# Patient Record
Sex: FEMALE | Race: WHITE | NOT HISPANIC OR LATINO | Employment: OTHER | URBAN - METROPOLITAN AREA
[De-identification: names, ages, dates, MRNs, and addresses within clinical notes are randomized per-mention and may not be internally consistent; named-entity substitution may affect disease eponyms.]

---

## 2021-03-02 ENCOUNTER — OFFICE VISIT (OUTPATIENT)
Dept: FAMILY MEDICINE CLINIC | Facility: CLINIC | Age: 86
End: 2021-03-02

## 2021-03-02 VITALS
TEMPERATURE: 97.8 F | HEART RATE: 77 BPM | WEIGHT: 97.8 LBS | HEIGHT: 62 IN | SYSTOLIC BLOOD PRESSURE: 124 MMHG | DIASTOLIC BLOOD PRESSURE: 82 MMHG | BODY MASS INDEX: 18 KG/M2

## 2021-03-02 DIAGNOSIS — I44.0 FIRST DEGREE AV BLOCK: ICD-10-CM

## 2021-03-02 DIAGNOSIS — R26.89 UNSTABLE BALANCE: ICD-10-CM

## 2021-03-02 DIAGNOSIS — Z99.89 OBSTRUCTIVE SLEEP APNEA ON CPAP: ICD-10-CM

## 2021-03-02 DIAGNOSIS — R00.2 PALPITATIONS: Primary | ICD-10-CM

## 2021-03-02 DIAGNOSIS — R01.1 MURMUR, CARDIAC: ICD-10-CM

## 2021-03-02 DIAGNOSIS — G47.33 OBSTRUCTIVE SLEEP APNEA ON CPAP: ICD-10-CM

## 2021-03-02 DIAGNOSIS — G40.409 GRAND MAL SEIZURE DISORDER (HCC): ICD-10-CM

## 2021-03-02 PROCEDURE — 93000 ELECTROCARDIOGRAM COMPLETE: CPT | Performed by: FAMILY MEDICINE

## 2021-03-02 PROCEDURE — 99203 OFFICE O/P NEW LOW 30 MIN: CPT | Performed by: FAMILY MEDICINE

## 2021-03-02 RX ORDER — PROPRANOLOL HYDROCHLORIDE 20 MG/1
20 TABLET ORAL 2 TIMES DAILY
COMMUNITY
End: 2021-04-20 | Stop reason: SDUPTHER

## 2021-03-02 RX ORDER — MULTIPLE VITAMINS W/ MINERALS TAB 9MG-400MCG
1 TAB ORAL DAILY
COMMUNITY

## 2021-03-02 RX ORDER — MULTIVIT WITH MINERALS/LUTEIN
1000 TABLET ORAL DAILY
COMMUNITY
End: 2021-04-14 | Stop reason: HOSPADM

## 2021-03-02 RX ORDER — ERGOCALCIFEROL 1.25 MG/1
3000 CAPSULE ORAL DAILY
COMMUNITY

## 2021-03-02 NOTE — PROGRESS NOTES
"Chief Complaint  New Patient (Est care with a new PCP, patients main concerns is getting a referal to a neurologist, she is very unsteady and shaky. Very worried about falling. She walks with assistance from another person)    Subjective          Mariela Hopkins presents to [facility]  Patient is here to establish care.  She is here from [place].  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  She spent many years on Dilantin and then in 2015 she was changed to Keppra by a new neurologist.  Also her neurologist started her on propranolol for tremors in 2017.  She is here today because about 2 years ago she started noticing being off balance with ambulation.  The neurologist felt that it was related to the years of Dilantin use which had caused a peripheral neuropathy.  Her primary care provider thought it was vertigo and prescribed physical therapy but the patient did not agree with that assessment and did not go to physical therapy.  But the patient states that she is not dizzy.  She states that she does feel lightheaded at times but the biggest issue is she \"feels like she is walking and Jell-O\".  She feels much more comfortable with ambulation if she holds on to someone or something while she is walking.  The patient has had a trip and fall down the stairs about 2 years ago but has not had any recent falls. Patient denies any LOC, CP or SOA.  However, recently she has had a few episodes of racing heart rate.  They were not associated with chest pain or shortness of breath and they were brief, during episodes of high stress.         Objective   Vital Signs:   /82   Pulse 77   Temp 97.8 °F (36.6 °C) (Temporal)   Ht 157.5 cm (62\")   Wt 44.4 kg (97 lb 12.8 oz)   BMI 17.89 kg/m²     Physical Exam  Vitals signs and nursing note reviewed.   Constitutional:       Appearance: Normal appearance. She is well-developed.   HENT:      Head: " Normocephalic and atraumatic.   Eyes:      Conjunctiva/sclera: Conjunctivae normal.   Neck:      Musculoskeletal: Normal range of motion and neck supple.   Cardiovascular:      Rate and Rhythm: Normal rate and regular rhythm. Occasional extrasystoles are present.     Pulses:           Dorsalis pedis pulses are 1+ on the right side and 1+ on the left side.        Posterior tibial pulses are 1+ on the right side and 2+ on the left side.      Heart sounds: Murmur (2/6 PILY ) present.   Pulmonary:      Effort: Pulmonary effort is normal.      Breath sounds: Normal breath sounds.   Musculoskeletal: Normal range of motion.      Right lower leg: No edema.      Left lower leg: No edema.   Skin:     General: Skin is warm and dry.      Capillary Refill: Capillary refill takes less than 2 seconds.      Findings: No rash.   Neurological:      Mental Status: She is alert and oriented to person, place, and time.   Psychiatric:         Mood and Affect: Mood normal.         Behavior: Behavior normal.         Thought Content: Thought content normal.         Judgment: Judgment normal.        Result Review :   The following data was reviewed by: Marla Mccray DO on 03/02/2021:           ECG 12 Lead    Date/Time: 3/2/2021 6:01 PM  Performed by: Marla Mccray DO  Authorized by: Marla Mccray DO   Previous ECG: no previous ECG available  Rhythm: sinus rhythm  Ectopy: unifocal PVCs and infrequent PVCs  Rate: normal  Conduction: 1st degree AV block  ST Segments: ST segments normal  T Waves: T waves normal    Clinical impression: abnormal EKG  Comments: Baseline artifact              Assessment and Plan    Diagnoses and all orders for this visit:    1. Palpitations (Primary)  -     Holter monitor - 24 hour; Future  -     Adult Transthoracic Echo Complete W/ Cont if Necessary Per Protocol; Future  -     ECG 12 Lead    2. Unstable balance  -     Holter monitor - 24 hour; Future  -     Adult Transthoracic Echo Complete W/ Cont if  Necessary Per Protocol; Future  -     Ambulatory Referral to Neurology  -     ECG 12 Lead    3. 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 Obstructive sleep apnea on CPAP  -     Ambulatory Referral to Neurology    5. Murmur, cardiac  -     Holter monitor - 24 hour; Future  -     Adult Transthoracic Echo Complete W/ Cont if Necessary Per Protocol; Future  -     ECG 12 Lead    6. First degree AV block      I will review lab results from the old PCP when obtained.  Consider repeating labs in 3 months.  If echo does not show any structural abnormalities and the Holter does not show any pathologic rhythms then I feel that the patient's unsteady gait and tremors are related to a neurologic issue and will leave the work-up up to the neurologist I have her referred her to.      Follow Up   Return in about 3 months (around 6/2/2021) for Next scheduled follow up.  Patient was given instructions and counseling regarding her condition or for health maintenance advice. Please see specific information pulled into the AVS if appropriate.

## 2021-03-12 ENCOUNTER — TELEPHONE (OUTPATIENT)
Dept: FAMILY MEDICINE CLINIC | Facility: CLINIC | Age: 86
End: 2021-03-12

## 2021-03-12 NOTE — TELEPHONE ENCOUNTER
Caller: KERRIE KEYES    Relationship: Emergency Contact    Best call back number: [phone]    What orders are you requesting (i.e. lab or imaging):   Heart Test   In what timeframe would the patient need to come in: ASAP     Where will you receive your lab/imaging services:     Additional notes: Patient's daughter called in and wanted an update on the test the Dr ordered for her heart. She said she has not heard anything. Please call patient and advise.

## 2021-04-14 ENCOUNTER — TELEPHONE (OUTPATIENT)
Dept: NEUROLOGY | Facility: CLINIC | Age: 86
End: 2021-04-14

## 2021-04-14 ENCOUNTER — OFFICE VISIT (OUTPATIENT)
Dept: NEUROLOGY | Facility: CLINIC | Age: 86
End: 2021-04-14

## 2021-04-14 ENCOUNTER — LAB (OUTPATIENT)
Dept: LAB | Facility: HOSPITAL | Age: 86
End: 2021-04-14

## 2021-04-14 VITALS
HEART RATE: 94 BPM | BODY MASS INDEX: 27.97 KG/M2 | WEIGHT: 152 LBS | HEIGHT: 62 IN | DIASTOLIC BLOOD PRESSURE: 70 MMHG | OXYGEN SATURATION: 94 % | SYSTOLIC BLOOD PRESSURE: 114 MMHG

## 2021-04-14 DIAGNOSIS — G40.209 PARTIAL SYMPTOMATIC EPILEPSY WITH COMPLEX PARTIAL SEIZURES, NOT INTRACTABLE, WITHOUT STATUS EPILEPTICUS (HCC): ICD-10-CM

## 2021-04-14 DIAGNOSIS — G62.9 PERIPHERAL POLYNEUROPATHY: Primary | ICD-10-CM

## 2021-04-14 DIAGNOSIS — R26.89 BALANCE PROBLEM: ICD-10-CM

## 2021-04-14 LAB
RPR SER QL: NORMAL
TSH SERPL DL<=0.05 MIU/L-ACNC: 0.8 UIU/ML (ref 0.27–4.2)
VIT B12 BLD-MCNC: 443 PG/ML (ref 211–946)

## 2021-04-14 PROCEDURE — 82607 VITAMIN B-12: CPT | Performed by: PSYCHIATRY & NEUROLOGY

## 2021-04-14 PROCEDURE — 84155 ASSAY OF PROTEIN SERUM: CPT | Performed by: PSYCHIATRY & NEUROLOGY

## 2021-04-14 PROCEDURE — 86592 SYPHILIS TEST NON-TREP QUAL: CPT | Performed by: PSYCHIATRY & NEUROLOGY

## 2021-04-14 PROCEDURE — 99205 OFFICE O/P NEW HI 60 MIN: CPT | Performed by: PSYCHIATRY & NEUROLOGY

## 2021-04-14 PROCEDURE — 82784 ASSAY IGA/IGD/IGG/IGM EACH: CPT | Performed by: PSYCHIATRY & NEUROLOGY

## 2021-04-14 PROCEDURE — 82595 ASSAY OF CRYOGLOBULIN: CPT | Performed by: PSYCHIATRY & NEUROLOGY

## 2021-04-14 PROCEDURE — 83825 ASSAY OF MERCURY: CPT | Performed by: PSYCHIATRY & NEUROLOGY

## 2021-04-14 PROCEDURE — 84443 ASSAY THYROID STIM HORMONE: CPT | Performed by: PSYCHIATRY & NEUROLOGY

## 2021-04-14 PROCEDURE — 36415 COLL VENOUS BLD VENIPUNCTURE: CPT | Performed by: PSYCHIATRY & NEUROLOGY

## 2021-04-14 PROCEDURE — 82175 ASSAY OF ARSENIC: CPT | Performed by: PSYCHIATRY & NEUROLOGY

## 2021-04-14 PROCEDURE — 83655 ASSAY OF LEAD: CPT | Performed by: PSYCHIATRY & NEUROLOGY

## 2021-04-14 PROCEDURE — 86334 IMMUNOFIX E-PHORESIS SERUM: CPT | Performed by: PSYCHIATRY & NEUROLOGY

## 2021-04-14 PROCEDURE — 84165 PROTEIN E-PHORESIS SERUM: CPT | Performed by: PSYCHIATRY & NEUROLOGY

## 2021-04-14 RX ORDER — MULTIVIT WITH MINERALS/LUTEIN
250 TABLET ORAL DAILY
COMMUNITY
End: 2023-01-17

## 2021-04-14 NOTE — TELEPHONE ENCOUNTER
Med Rec Request faxed to [facility] [employer] [facility] per representative at [phone].

## 2021-04-14 NOTE — TELEPHONE ENCOUNTER
----- Message from Marianna Elder MD sent at 4/14/2021  9:42 AM EDT -----  Regarding: labs  Can we request labs from this patient's previous primary care physician?  Dr. Booker [phone].    Thanks!

## 2021-04-14 NOTE — PATIENT INSTRUCTIONS
• At night, only use the bed and bedroom for sleep and sex only. Do not read, watch TV, eat, or worry in bed.   • Lie down only when you are sleepy.   • If you are unable to fall asleep, get up and go to another room. Avoid stimulating activities if you do get up.   • No clocks (if you tend to look at the clock throughout the night) or TV (or other electronic screens) in the bedroom.   • Avoid screens (TV, computer, tablet, cell phone) the hour prior to bedtime and during your sleep period.   • Establish a regular bedtime routine and a regular sleep/wake schedule. Keep this schedule 7 days a week.   • Do not eat or drink close to bedtime.   • Make sure your bedroom is dark, cool, and comfortable.   • If you need background noise, use a fan or a white noise machine.   • Avoid caffeine (regular coffee, decaf coffee, tea, sodas, chocolate, energy drinks).   • Avoid alcohol and nicotine close to bedtime.   • Exercise during the day, but not within 3 hours of bedtime.   • Avoid naps.   • Check if any of your night time medications may cause sleep problems.   • If you have heart burn or indigestion at night: avoid eating close to bedtime; elevated your head of bed; avoid spicy foods, tomatoes, citrus, alcohol, caffeine, and mint at night; take your antacid at night; sleep on your left side.   • If you have nasal stuffiness or congestion: consider taking an over the counter allergy medicine such as zyrtec, claritin, or allegra at night as well as a nasal spray such as Flonase or Nasacort.   • If you gasp for air at night, stop breathing in your sleep, have night sweats, snore, unrefreshing sleep, feel tired during the day, have morning headaches or dry mouth in the morning, you may be at risk for having problems with your breathing at night, likely sleep apnea. You may want to talk to your doctor about these symptoms.   • Consider trying melatonin at night. This can be purchased over the counter without a prescription.  I  recommend doses between 0.5-3 mg.  Try [employer], [employer], [employer] Extension (on Amazon) or REM Fresh brands.    • Consider the Night Equivalent DATAl Sleep  maggie or CBT-I  for your smart device.

## 2021-04-14 NOTE — PROGRESS NOTES
Subjective:     Patient ID: Mariela Hopkins is a 87 y.o. female.    The patient is a 87-year-old right-handed female with history of a brain aneurysm status post surgery, cataract status post removal, skin cancer, epilepsy, gallstones, headaches, hearing loss, neuropathy, sleep apnea on CPAP, osteoporosis, and PRP (a dermatologic condition) who presents to the neurology clinic today as a new patient for the evaluation of balance problems, tremor, sleep apnea, and epilepsy.  I reviewed the patient's records.  I reviewed the referring physician's note from March 2, 2021.  Reports the patient has a history of a seizure disorder after having a brain aneurysm many years ago.  She was on Dilantin and then 2015 was switched to Keppra.  She was on propranolol for tremor since 2017.  She feels off balance.  It was thought that maybe she had a peripheral neuropathy from her Dilantin use.  She reports that she is lightheaded and feels like she is walking on Jell-O.  She denies any recent falls.  I reviewed her most recent neurologist note from May 26, 2020.  Reports that she has osteoporosis that could have been related to her chronic antiseizure medication use as well as neuropathy.  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  It reports that she has a large fiber neuropathy, vitamin D deficiency, essential tremor and sleep apnea on CPAP.  He reports that she has not had a seizure in over 20 years.  He reports that higher doses of propranolol caused her to feel more depressed and dizzy.  She since had her first seizure in her 30s.  It was when she was pregnant.  The patient had a EEG on March 7, 2016 that showed right temporal slowing with a breach rhythm.  She had a head CT with contrast and a CT of the head in 2012 that showed postsurgical changes with a right parietal clip.    Is so shaky. In both feet when she walks.  Ankles and calves feels like they were wrapped in duct tape.   Like her brain isn't connected to her feet.  Symptoms started about 1-2 years.  But has gotten worse over time.  Worse in the mornings.  Feels better by evening.  Gets dizzy when she rolls over.  Not like past vertigo episodes.  Some days are better than others.  Has been under a lot of stress.  No falls in the past year.  Did PT 5 years ago for 6 months.  Balance has worsened since then.  Moving into assisted living.  Has not had an EMG.  Has neck pain.  No h/o injuries, surgery.      Brain aneurysm ruptured 50 years ago while pregnant and had her first seizure after.  Had one brain surgery.  Had 6 seizures.  Last one was 25 years ago.  Would get a feeling before (like a disconnect).  Then would have LOC.  Whole body shaking.  Afterwards, she was sleepy.  No incontinence, tongue biting, injuries.  No triggers.  Before her seizures, she would get weakness in her left hand.  Currently on  mg BID.  Has been on it for 3 years.  No higher doses.  No SE.  It is affordable.  Was on PHT and PB in the past for 40 years.      Clusters? no  Status? no    Risk factors:  Childhood/febrile seizures? no  Head trauma/pathology? Brain aneurysm  CNS infections? no  Family history of seizures? no    Driving? no  MRI brain: Not sure      The following portions of the patient's history were reviewed and updated as appropriate: allergies, current medications, past family history, past medical history, past social history, past surgical history and problem list.    Review of Systems   Constitutional: Positive for activity change and fatigue. Negative for appetite change.   HENT: Positive for hearing loss. Negative for tinnitus and trouble swallowing.    Eyes: Negative for photophobia, pain and visual disturbance.   Respiratory: Negative for cough, chest tightness and shortness of breath.    Cardiovascular: Negative for chest pain, palpitations and leg swelling.   Gastrointestinal: Positive for constipation.   Endocrine: Negative  for cold intolerance, heat intolerance and polydipsia.   Musculoskeletal: Positive for gait problem, neck pain and neck stiffness.   Allergic/Immunologic: Negative for environmental allergies, food allergies and immunocompromised state.   Neurological: Positive for tremors, weakness, light-headedness and headaches. Negative for dizziness, seizures, syncope, facial asymmetry, speech difficulty and numbness.   Psychiatric/Behavioral: Positive for sleep disturbance. Negative for agitation, behavioral problems, confusion, decreased concentration, dysphoric mood, hallucinations, self-injury and suicidal ideas. The patient is nervous/anxious. The patient is not hyperactive.     I reviewed the ROS documented by the MA.  All other systems negative.      Objective:    Neurologic Exam    Physical Exam   **The patient is wearing a mask**  Constitutional:  Vital signs reviewed.  No apparent distress.  Well groomed.  Eyes:  No injection, no icterus.    Respiratory:  Normal effort.  Clear to auscultation bilaterally.  Cardiovascular:  Regular rate and rhythm.  No murmurs.  No carotid bruits. Symmetric radial pulses.  Musculoskeletal: Unsteady gait.  Present Romberg.  No focal weakness appreciated.  Skin:  No rashes.  Warm, dry, and intact.  Psychiatric:  Good mood.  Normal affect.    Neurologic:  Mental status-  The patient is alert and oriented to person, place and time. Attention/concentration is within normal limits.  Speech is fluent without dysarthria.  The patient is able to name, repeat and follow complex commands without difficulty.  Immediate memory intact.  She recalled 2 out of 3 words with a clue.  Fund of knowledge normal.  Cranial nerves- Pupils equally round and reactive to light with intact accomodation.  She has a left lower quadrantanopsia.  Extraocular movements intact.  Facial sensation intact.   Hearing intact to finger-rub bilaterally.  SCM and trapezius are 5/5 bilaterally.    Motor-  See musculoskeletal  above.  No tremor.  Reflexes- 1+ in the bilateral biceps, brachioradialis, patellar and absent achilles.  Toes down-going bilaterally.  Sensation-decreased to vibration in the distal bilateral lower extremities.  Coordination- Intact to finger tapping and heel knee shin bilaterally.   Gait- See musculoskeletal exam above.       Assessment/Plan:    The patient is an 87-year-old right-handed female with history of brain aneurysm status post surgery, cataract status post removal, skin cancer, epilepsy, gallstones, headaches, hearing loss, neuropathy, sleep apnea on CPAP, osteoporosis, and PRP who presents today for evaluation.    1.  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  Given her history, it could be due to her Dilantin use.  I would like to check some blood work for other causes of neuropathy.  The patient declined an EMG today.  I also encourage physical therapy.    2.  Epilepsy-patient likely has structural epilepsy with focal onset seizures.  Fortunately she remains seizure-free without major side effects to Keppra monotherapy.  I recommend continuing on the current dose with no changes.    3.  Sleep apnea-unfortunately we do not have any records.  I would like to request that.  The patient would like to hold off on cognitive behavioral therapy.  We will schedule follow-up to further address.    4.  Tremor-no tremor appreciated on exam today.  The patient takes propranolol.  We can further discuss at her next follow-up visit.    A total of 60 minutes of time was spent on this encounter today.  This included reviewing the patient's records, face-to-face time, and documentation.       Problems Addressed this Visit     None      Visit Diagnoses     Peripheral polyneuropathy    -  Primary    Relevant Orders    Vitamin B12    TSH Rfx On Abnormal To Free T4    Protein Elec + Interp, Serum    Nerve Conduction Test    Immunofixation, Serum    RPR    Heavy Metals, Blood     EMG    Cryoglobulin    Balance problem        Relevant Orders    Ambulatory Referral to Physical Therapy Neuro    Partial symptomatic epilepsy with complex partial seizures, not intractable, without status epilepticus (CMS/[facility])          Diagnoses       Codes Comments    Peripheral polyneuropathy    -  Primary ICD-10-CM: G62.9  ICD-9-CM: 356.9     Balance problem     ICD-10-CM: R26.89  ICD-9-CM: 781.99     Partial symptomatic epilepsy with complex partial seizures, not intractable, without status epilepticus (CMS/[facility])     ICD-10-CM: G40.209  ICD-9-CM: 345.40              **We received records from her sleep specialist.  She had a home sleep study October 13, 2017.  It showed an overall AHI of 34.1.  She was last seen by her sleep specialist on February 9, 2021.  At that time she was on levetiracetam 500 mg twice a day and phenobarbital 45 mg at night.  It is suspected that she has a large fiber neuropathy from her phenobarbital which causes her to feel unsteady on her feet.  Also reports that she has a repaired cerebral aneurysm with well-controlled epilepsy.  It says that she is on AutoPap with a range from 7-15.  She is mainly using pressures between 11.4 and 12.9.  She has a [employer]ear full facemask.

## 2021-04-15 LAB
ALBUMIN SERPL ELPH-MCNC: 3.7 G/DL (ref 2.9–4.4)
ALBUMIN/GLOB SERPL: 1 {RATIO} (ref 0.7–1.7)
ALPHA1 GLOB SERPL ELPH-MCNC: 0.3 G/DL (ref 0–0.4)
ALPHA2 GLOB SERPL ELPH-MCNC: 0.9 G/DL (ref 0.4–1)
B-GLOBULIN SERPL ELPH-MCNC: 1 G/DL (ref 0.7–1.3)
GAMMA GLOB SERPL ELPH-MCNC: 1.4 G/DL (ref 0.4–1.8)
GLOBULIN SER CALC-MCNC: 3.6 G/DL (ref 2.2–3.9)
IGA SERPL-MCNC: 209 MG/DL (ref 64–422)
IGG SERPL-MCNC: 1333 MG/DL (ref 586–1602)
IGM SERPL-MCNC: 201 MG/DL (ref 26–217)
LABORATORY COMMENT REPORT: NORMAL
M PROTEIN SERPL ELPH-MCNC: NORMAL G/DL
PROT PATTERN SERPL ELPH-IMP: NORMAL
PROT PATTERN SERPL IFE-IMP: NORMAL
PROT SERPL-MCNC: 7.3 G/DL (ref 6–8.5)

## 2021-04-16 ENCOUNTER — HOSPITAL ENCOUNTER (OUTPATIENT)
Dept: CARDIOLOGY | Facility: HOSPITAL | Age: 86
Discharge: HOME | End: 2021-04-16

## 2021-04-16 ENCOUNTER — HOSPITAL ENCOUNTER (OUTPATIENT)
Dept: CARDIOLOGY | Facility: HOSPITAL | Age: 86
Setting detail: HOSPITAL OUTPATIENT SURGERY
Discharge: HOME | End: 2021-04-16

## 2021-04-16 VITALS
WEIGHT: 152 LBS | DIASTOLIC BLOOD PRESSURE: 81 MMHG | HEART RATE: 78 BPM | SYSTOLIC BLOOD PRESSURE: 130 MMHG | BODY MASS INDEX: 27.97 KG/M2 | HEIGHT: 62 IN

## 2021-04-16 DIAGNOSIS — R01.1 MURMUR, CARDIAC: ICD-10-CM

## 2021-04-16 DIAGNOSIS — R00.2 PALPITATIONS: ICD-10-CM

## 2021-04-16 DIAGNOSIS — R26.89 UNSTABLE BALANCE: ICD-10-CM

## 2021-04-16 LAB
AORTIC DIMENSIONLESS INDEX: 0.8 (DI)
BH CV ECHO MEAS - ACS: 1.8 CM
BH CV ECHO MEAS - AI DEC SLOPE: 175 CM/SEC^2
BH CV ECHO MEAS - AI MAX PG: 47.3 MMHG
BH CV ECHO MEAS - AI MAX VEL: 343.5 CM/SEC
BH CV ECHO MEAS - AI P1/2T: 574.9 MSEC
BH CV ECHO MEAS - AO MAX PG: 7 MMHG
BH CV ECHO MEAS - AO MEAN PG (FULL): 1 MMHG
BH CV ECHO MEAS - AO MEAN PG: 3 MMHG
BH CV ECHO MEAS - AO ROOT AREA (BSA CORRECTED): 1.9
BH CV ECHO MEAS - AO ROOT AREA: 8.6 CM^2
BH CV ECHO MEAS - AO ROOT DIAM: 3.3 CM
BH CV ECHO MEAS - AO V2 MAX: 132.4 CM/SEC
BH CV ECHO MEAS - AO V2 MEAN: 85.8 CM/SEC
BH CV ECHO MEAS - AO V2 VTI: 25.3 CM
BH CV ECHO MEAS - AVA(I,A): 2.7 CM^2
BH CV ECHO MEAS - AVA(I,D): 2.7 CM^2
BH CV ECHO MEAS - BSA(HAYCOCK): 1.8 M^2
BH CV ECHO MEAS - BSA: 1.7 M^2
BH CV ECHO MEAS - BZI_BMI: 27.8 KILOGRAMS/M^2
BH CV ECHO MEAS - BZI_METRIC_HEIGHT: 157.5 CM
BH CV ECHO MEAS - BZI_METRIC_WEIGHT: 68.9 KG
BH CV ECHO MEAS - EDV(CUBED): 35.9 ML
BH CV ECHO MEAS - EDV(MOD-SP2): 45 ML
BH CV ECHO MEAS - EDV(MOD-SP4): 45 ML
BH CV ECHO MEAS - EDV(TEICH): 44.1 ML
BH CV ECHO MEAS - EF(CUBED): 66.1 %
BH CV ECHO MEAS - EF(MOD-BP): 71 %
BH CV ECHO MEAS - EF(MOD-SP2): 68.9 %
BH CV ECHO MEAS - EF(MOD-SP4): 71.1 %
BH CV ECHO MEAS - EF(TEICH): 58.9 %
BH CV ECHO MEAS - ESV(CUBED): 12.2 ML
BH CV ECHO MEAS - ESV(MOD-SP2): 14 ML
BH CV ECHO MEAS - ESV(MOD-SP4): 13 ML
BH CV ECHO MEAS - ESV(TEICH): 18.1 ML
BH CV ECHO MEAS - FS: 30.3 %
BH CV ECHO MEAS - IVS/LVPW: 1.2
BH CV ECHO MEAS - IVSD: 1.2 CM
BH CV ECHO MEAS - LAT PEAK E' VEL: 9.5 CM/SEC
BH CV ECHO MEAS - LV DIASTOLIC VOL/BSA (35-75): 26.5 ML/M^2
BH CV ECHO MEAS - LV MASS(C)D: 109.1 GRAMS
BH CV ECHO MEAS - LV MASS(C)DI: 64.2 GRAMS/M^2
BH CV ECHO MEAS - LV MAX PG: 5 MMHG
BH CV ECHO MEAS - LV MEAN PG: 2 MMHG
BH CV ECHO MEAS - LV SYSTOLIC VOL/BSA (12-30): 7.6 ML/M^2
BH CV ECHO MEAS - LV V1 MAX: 100.8 CM/SEC
BH CV ECHO MEAS - LV V1 MEAN: 65.6 CM/SEC
BH CV ECHO MEAS - LV V1 VTI: 21.5 CM
BH CV ECHO MEAS - LVIDD: 3.3 CM
BH CV ECHO MEAS - LVIDS: 2.3 CM
BH CV ECHO MEAS - LVLD AP2: 6.6 CM
BH CV ECHO MEAS - LVLD AP4: 6.6 CM
BH CV ECHO MEAS - LVLS AP2: 5.4 CM
BH CV ECHO MEAS - LVLS AP4: 5.5 CM
BH CV ECHO MEAS - LVOT AREA (M): 3.1 CM^2
BH CV ECHO MEAS - LVOT AREA: 3.1 CM^2
BH CV ECHO MEAS - LVOT DIAM: 2 CM
BH CV ECHO MEAS - LVPWD: 1 CM
BH CV ECHO MEAS - MED PEAK E' VEL: 7.5 CM/SEC
BH CV ECHO MEAS - MR MAX PG: 24.8 MMHG
BH CV ECHO MEAS - MR MAX VEL: 249 CM/SEC
BH CV ECHO MEAS - MV A DUR: 0.12 SEC
BH CV ECHO MEAS - MV A MAX VEL: 109 CM/SEC
BH CV ECHO MEAS - MV DEC SLOPE: 291 CM/SEC^2
BH CV ECHO MEAS - MV DEC TIME: 211 SEC
BH CV ECHO MEAS - MV E MAX VEL: 59.2 CM/SEC
BH CV ECHO MEAS - MV E/A: 0.54
BH CV ECHO MEAS - MV MEAN PG: 2 MMHG
BH CV ECHO MEAS - MV P1/2T MAX VEL: 85.7 CM/SEC
BH CV ECHO MEAS - MV P1/2T: 86.3 MSEC
BH CV ECHO MEAS - MV V2 MEAN: 71.7 CM/SEC
BH CV ECHO MEAS - MV V2 VTI: 20.9 CM
BH CV ECHO MEAS - MVA P1/2T LCG: 2.6 CM^2
BH CV ECHO MEAS - MVA(P1/2T): 2.6 CM^2
BH CV ECHO MEAS - MVA(VTI): 3.2 CM^2
BH CV ECHO MEAS - PA ACC SLOPE: 1711 CM/SEC^2
BH CV ECHO MEAS - PA ACC TIME: 0.05 SEC
BH CV ECHO MEAS - PA MAX PG: 3.7 MMHG
BH CV ECHO MEAS - PA PR(ACCEL): 55.2 MMHG
BH CV ECHO MEAS - PA V2 MAX: 96 CM/SEC
BH CV ECHO MEAS - PULM A REVS DUR: 0.11 SEC
BH CV ECHO MEAS - PULM A REVS VEL: 43.9 CM/SEC
BH CV ECHO MEAS - PULM DIAS VEL: 39.5 CM/SEC
BH CV ECHO MEAS - PULM S/D: 1.3
BH CV ECHO MEAS - PULM SYS VEL: 51.8 CM/SEC
BH CV ECHO MEAS - QP/QS: 0.78
BH CV ECHO MEAS - RAP SYSTOLE: 3 MMHG
BH CV ECHO MEAS - RV MEAN PG: 2 MMHG
BH CV ECHO MEAS - RV V1 MEAN: 67.5 CM/SEC
BH CV ECHO MEAS - RV V1 VTI: 20.8 CM
BH CV ECHO MEAS - RVOT AREA: 2.5 CM^2
BH CV ECHO MEAS - RVOT DIAM: 1.8 CM
BH CV ECHO MEAS - RVSP: 34 MMHG
BH CV ECHO MEAS - SI(AO): 127.2 ML/M^2
BH CV ECHO MEAS - SI(CUBED): 14 ML/M^2
BH CV ECHO MEAS - SI(LVOT): 39.7 ML/M^2
BH CV ECHO MEAS - SI(MOD-SP2): 18.2 ML/M^2
BH CV ECHO MEAS - SI(MOD-SP4): 18.8 ML/M^2
BH CV ECHO MEAS - SI(TEICH): 15.3 ML/M^2
BH CV ECHO MEAS - SV(AO): 216.4 ML
BH CV ECHO MEAS - SV(CUBED): 23.8 ML
BH CV ECHO MEAS - SV(LVOT): 67.5 ML
BH CV ECHO MEAS - SV(MOD-SP2): 31 ML
BH CV ECHO MEAS - SV(MOD-SP4): 32 ML
BH CV ECHO MEAS - SV(RVOT): 52.9 ML
BH CV ECHO MEAS - SV(TEICH): 26 ML
BH CV ECHO MEAS - TAPSE (>1.6): 2 CM
BH CV ECHO MEAS - TR MAX PG: 32 MMHG
BH CV ECHO MEAS - TR MAX VEL: 284 CM/SEC
BH CV ECHO MEASUREMENTS AVERAGE E/E' RATIO: 6.96
BH CV XLRA - RV BASE: 2.7 CM
BH CV XLRA - RV LENGTH: 4.2 CM
BH CV XLRA - RV MID: 2.4 CM
LEFT ATRIUM VOLUME INDEX: 23.5 ML/M2
MAXIMAL PREDICTED HEART RATE: 133 BPM
SINUS: 2.9 CM
STJ: 2.2 CM
STRESS TARGET HR: 113 BPM

## 2021-04-16 PROCEDURE — 93306 TTE W/DOPPLER COMPLETE: CPT

## 2021-04-16 PROCEDURE — 93226 XTRNL ECG REC<48 HR SCAN A/R: CPT

## 2021-04-16 PROCEDURE — 93306 TTE W/DOPPLER COMPLETE: CPT | Performed by: INTERNAL MEDICINE

## 2021-04-16 PROCEDURE — 93225 XTRNL ECG REC<48 HRS REC: CPT

## 2021-04-17 DIAGNOSIS — R26.89 UNSTABLE BALANCE: ICD-10-CM

## 2021-04-17 DIAGNOSIS — I44.0 FIRST DEGREE AV BLOCK: ICD-10-CM

## 2021-04-17 DIAGNOSIS — Q23.9 ABNORMALITY OF AORTIC VALVE: ICD-10-CM

## 2021-04-17 DIAGNOSIS — R00.2 PALPITATIONS: Primary | ICD-10-CM

## 2021-04-17 DIAGNOSIS — R01.1 MURMUR, CARDIAC: ICD-10-CM

## 2021-04-17 DIAGNOSIS — Q21.12 PFO (PATENT FORAMEN OVALE): ICD-10-CM

## 2021-04-17 LAB
ARSENIC BLD-MCNC: 5 UG/L (ref 2–23)
LEAD BLDV-MCNC: 1 UG/DL (ref 0–4)
MERCURY BLD-MCNC: <1 UG/L (ref 0–14.9)

## 2021-04-19 LAB — CRYOGLOB SER QL 1D COLD INC: NORMAL

## 2021-04-19 PROCEDURE — 93227 XTRNL ECG REC<48 HR R&I: CPT | Performed by: INTERNAL MEDICINE

## 2021-04-20 ENCOUNTER — OFFICE VISIT (OUTPATIENT)
Dept: FAMILY MEDICINE CLINIC | Facility: CLINIC | Age: 86
End: 2021-04-20

## 2021-04-20 VITALS
TEMPERATURE: 97.1 F | OXYGEN SATURATION: 98 % | HEIGHT: 62 IN | WEIGHT: 153.2 LBS | SYSTOLIC BLOOD PRESSURE: 122 MMHG | DIASTOLIC BLOOD PRESSURE: 74 MMHG | BODY MASS INDEX: 28.19 KG/M2 | HEART RATE: 76 BPM

## 2021-04-20 DIAGNOSIS — Q23.9 ABNORMALITY OF AORTIC VALVE: Primary | ICD-10-CM

## 2021-04-20 DIAGNOSIS — G25.0 ESSENTIAL TREMOR: ICD-10-CM

## 2021-04-20 DIAGNOSIS — Q21.12 PFO (PATENT FORAMEN OVALE): ICD-10-CM

## 2021-04-20 PROCEDURE — 99213 OFFICE O/P EST LOW 20 MIN: CPT | Performed by: FAMILY MEDICINE

## 2021-04-20 RX ORDER — PROPRANOLOL HYDROCHLORIDE 20 MG/1
20 TABLET ORAL 2 TIMES DAILY
Qty: 180 TABLET | Refills: 1 | Status: SHIPPED | OUTPATIENT
Start: 2021-04-20 | End: 2021-07-27 | Stop reason: SDUPTHER

## 2021-04-20 NOTE — PROGRESS NOTES
"Chief Complaint  Results (review echo results)    Subjective          Mariela Hopkins presents to [facility]  Patient is here to follow-up on echo results.  The reason the echo was obtained was because the patient was having mild intermittent lightheadedness and balance issues and I also heard a systolic murmur on exam.  She started about 2 years ago being off balance with ambulation.  The neurologist felt that it was related to the years of Dilantin use which had caused a peripheral neuropathy.  Her primary care provider thought it was vertigo and prescribed physical therapy but the patient did not agree with that assessment and did not go to physical therapy.  The patient states that she is not dizzy.  She states that she does feel lightheaded at times but the biggest issue is she \"feels like she is walking and Jell-O\".  She feels much more comfortable with ambulation if she holds on to someone or something while she is walking.    Patient is also here to follow-up on essential tremor.  She states that she has a history of essential tremor that unfortunately was not addressed at her recent neurology appointment.  She takes propranolol 20 mg twice daily and it really helps throughout the day with her shakiness.  She would like to continue that dosage.  She denies any side effects.      Objective   Vital Signs:   /74   Pulse 76   Temp 97.1 °F (36.2 °C)   Ht 157.5 cm (62\")   Wt 69.5 kg (153 lb 3.2 oz)   SpO2 98%   BMI 28.02 kg/m²     Physical Exam  Vitals and nursing note reviewed.   Constitutional:       Appearance: Normal appearance. She is well-developed and normal weight.   HENT:      Head: Normocephalic and atraumatic.   Eyes:      General: No scleral icterus.     Conjunctiva/sclera: Conjunctivae normal.   Cardiovascular:      Rate and Rhythm: Normal rate and regular rhythm.      Heart sounds: Murmur (2/6 PILY) heard.     Pulmonary:      Effort: Pulmonary effort is " normal.      Breath sounds: Normal breath sounds.   Musculoskeletal:         General: Normal range of motion.      Cervical back: Normal range of motion and neck supple.   Skin:     General: Skin is warm and dry.      Capillary Refill: Capillary refill takes less than 2 seconds.      Findings: No rash.   Neurological:      Mental Status: She is alert and oriented to person, place, and time.   Psychiatric:         Mood and Affect: Mood normal.         Behavior: Behavior normal.         Thought Content: Thought content normal.         Judgment: Judgment normal.        Result Review :   The following data was reviewed by: Marla Mccray DO on 04/20/2021:  Common labs    Common Labsle 4/14/21   Total Protein 7.3   Albumin 3.7           TSH    TSH 4/14/21   TSH 0.796                     Assessment and Plan    Diagnoses and all orders for this visit:    1. Abnormality of aortic valve (Primary)    2. PFO (patent foramen ovale)    3. Essential tremor  -     propranolol (INDERAL) 20 MG tablet; Take 1 tablet by mouth 2 (Two) Times a Day.  Dispense: 180 tablet; Refill: 1    The patient was present with her daughter, Aan, and I had a long discussion with him about the results of the echo including the PFO and the abnormality of the aortic valve.  I have referred the patient to a cardiologist who will likely perform a transesophageal echo to better evaluate the aortic valve and to see if there is any relationship with this abnormality and the patient's lightheadedness.  Patient was also seen for chronic stable essential tremor.  I would like her to follow-up with neurology in the future to see if propranolol would be the continued way neurology would prefer to treat this essential tremor but in the meantime I will provide the patient with refills.    Follow Up   Return if symptoms worsen or fail to improve.  Patient was given instructions and counseling regarding her condition or for health maintenance advice. Please see  specific information pulled into the AVS if appropriate.

## 2021-04-28 ENCOUNTER — TELEPHONE (OUTPATIENT)
Dept: NEUROLOGY | Facility: CLINIC | Age: 86
End: 2021-04-28

## 2021-04-28 NOTE — TELEPHONE ENCOUNTER
Please review. Patient is now living in a facility where caretenders does the P.T. Do we need to do something different with the referral to get the information to them that it's okay for them to do it there?    Thank you

## 2021-04-28 NOTE — TELEPHONE ENCOUNTER
Provider: DR LARIOS    Caller: KERRIE     Relationship to Patient: DAUGHTER / POA    Phone Number: [phone]    Reason for Call: THE PT'S DAUGHTER WANTED TO LET DR LARIOS KNOW THAT THE PT IS LIVING IN AN ASSISTED LIVING FACILITY AND THIS FACILITY HAS CARETENDERS. KERRIE IS WONDERING IF THE PT COULD DO HER PHYSICAL THERAPY AT THE FACILITY WITH THE CARETENDERS INSTEAD OF HAVING TO LEAVE THE FACILITY. SHE ALSO STATE [facility] CALLED THE PT YESTERDAY BUT THEY DID NOT SET ANYTHING UP YET BECAUSE THEY WANTED TO RUN THIS BY DR LARIOS FIRST. PLEASE GIVE HER A CALL BACK TO DISCUSS THIS.     no

## 2021-04-29 ENCOUNTER — TELEPHONE (OUTPATIENT)
Dept: FAMILY MEDICINE CLINIC | Facility: CLINIC | Age: 86
End: 2021-04-29

## 2021-04-29 NOTE — TELEPHONE ENCOUNTER
Caller: KERRIE KEYES    Relationship: Emergency Contact    Best call back number: [phone] (M)    What is the medical concern/diagnosis: HEART ISSUES    What specialty or service is being requested: CARDIOLOGY     What is the provider, practice or medical service name: DR. WATTERS    What is the office location: [facility]    What is the office phone number:     Any additional details: KERRIE CALLED TO CHECK THE STATUS OF THE APPOINTMENT BEING MADE FOR PATIENT TO SEE DR. WATTERS      PLEASE CONTACT KERRIE TO ADVISE

## 2021-05-05 ENCOUNTER — TELEPHONE (OUTPATIENT)
Dept: FAMILY MEDICINE CLINIC | Facility: CLINIC | Age: 86
End: 2021-05-05

## 2021-05-05 NOTE — TELEPHONE ENCOUNTER
Her daughter Ana stated She is very shaky feels like her feet are not connected to her head, and feet feel like they are wrapped in duck tape.  When she takes the propanolol it makes it better.  This feeling goes away after she takes the propanolol but comes back after it wears off.  She stated that her previous doctor [place] of [place] told her it was ok to take 3 times a day, she just wanted to ask you.    She is in [facility] assisted living and doesn't have a way to check her vitals. (per daughter)    Please advise

## 2021-05-05 NOTE — TELEPHONE ENCOUNTER
Caller: KERRIE KEYES    Relationship to patient: Emergency Contact    Best call back number: [phone]    Patient is needing: PATENTS DAUGHTER CALLED STATING PATIENT NORMALLY TAKES THE MEDICATION propranolol (INDERAL) 20 MG tablet IN THE MORNING AND AT NIGHT. PATIENT IS WANTING TO KNOW IF SHE CAN TAKE A 3RD PILL FOR THE AFTERNOON.

## 2021-05-05 NOTE — TELEPHONE ENCOUNTER
This patient is relatively new to me and propranolol can be taken for many different diagnoses.  Please ask the patient or her daughter why she takes the propranolol and why she wants to increase it to 3 times a day?  Also, has she checked her blood pressure or heart rate today?  If so what were the readings?

## 2021-05-05 NOTE — TELEPHONE ENCOUNTER
pts daughter aware.  She was advised to call [facility] and advise them that she was increasing some medication to monitor her BP and pulse closely,

## 2021-05-05 NOTE — TELEPHONE ENCOUNTER
Yes, propranolol can be taken 3 times a day.  However, I recommend if she increases it, to closely monitor her heart rate and blood pressure over the next few days to make sure it is not causing low heart rate or low blood pressures.

## 2021-05-07 ENCOUNTER — TELEPHONE (OUTPATIENT)
Dept: NEUROLOGY | Facility: CLINIC | Age: 86
End: 2021-05-07

## 2021-05-12 ENCOUNTER — TELEPHONE (OUTPATIENT)
Dept: FAMILY MEDICINE CLINIC | Facility: CLINIC | Age: 86
End: 2021-05-12

## 2021-05-12 NOTE — TELEPHONE ENCOUNTER
Can you please contact Dr Vasques office and tell them what is going on with this patient and how the patient now cannot get back in for months and she is getting worse? Also let them know that she is even considering going to a new neurologist because it is going to take so long to get back in.  I really feel that the neurologist needs to get her in sooner bc it will take even longer to establish with a different neurologist.  Or Dr Elder works with a group of neurologists, maybe another neurologist in her group can see her sooner.

## 2021-05-12 NOTE — TELEPHONE ENCOUNTER
Caller: KERRIE KEYES    Relationship: Emergency Contact    Best call back number: [phone]    What is the medical concern/diagnosis: REFERRAL    What specialty or service is being requested: NEUROLOGIST    What is the provider, practice or medical service name: DR. ABIDA LARIOS WAS SEEN.    Any additional details: YES, PATIENT SAW DR. LARIOS AND THE ENTIRE TIME WAS SPENT TALKING ABOUT HER HISTORY AND THEN WHEN IT CAME TIME TO TALK ABOUT HER SYMPTOMS, DR. LARIOS ADVISED THAT SHE DIDN'T HAVE TIME TO TALK TO THEM AND TO SCHEDULE FOR HER NEXT AVAILABLE APPOINTMENT WHICH WAS July.  PATIENT IS STILL FEELS UNSTEADY AND STATES THAT HER BRAIN ISN'T CONNECTING TO HER FEET.  PATIENT'S DAUGHTER KERRIE WOULD LIKE HELP FINDING A NEW NEUROLOGIST TO SEE HER MOTHER SOONER OR IF DR. BAE COULD HELP GET HER INTO DR. LARIOS'S OFFICE SOONER THAT WOULD BE GREAT.  PLEASE ADVISE

## 2021-05-13 NOTE — TELEPHONE ENCOUNTER
Thank you for trying Mary.  Please let she and her daughter know what you have done for them.  Please have the patient follow-up with me sooner if she needs anything.

## 2021-05-13 NOTE — TELEPHONE ENCOUNTER
Called dr manrique office and spoke to dayana. I explained that the patient has worsening symptoms and needs to be seen sooner. She checked and dr luther nor any other provider or location they have, have any openings before her July date. They did add her to a waiting list

## 2021-05-26 RX ORDER — LEVETIRACETAM 500 MG/1
500 TABLET ORAL 2 TIMES DAILY
Qty: 180 TABLET | Refills: 3 | Status: SHIPPED | OUTPATIENT
Start: 2021-05-26 | End: 2022-01-17 | Stop reason: SDUPTHER

## 2021-05-26 NOTE — TELEPHONE ENCOUNTER
Caller: JASMIN NÚÑEZ    Relationship: SELF    Best call back number: [phone]    Medication needed:   KEPPRA 500 MG   3 MONTH SUPPLY    When do you need the refill by: ASAP    What additional details did the patient provide when requesting the medication: THE PT STATED SHE HAS 4 DAYS LEFT    Does the patient have less than a 3 day supply:  [] Yes  [x] No    What is the patient's preferred pharmacy:    [facility]/pharmacy #[id number] - [place], [place] - [address] [phone] [facility] [phone]

## 2021-06-14 ENCOUNTER — OFFICE VISIT (OUTPATIENT)
Dept: CARDIOLOGY | Facility: CLINIC | Age: 86
End: 2021-06-14

## 2021-06-14 VITALS
DIASTOLIC BLOOD PRESSURE: 80 MMHG | BODY MASS INDEX: 28.52 KG/M2 | HEIGHT: 62 IN | HEART RATE: 68 BPM | WEIGHT: 155 LBS | SYSTOLIC BLOOD PRESSURE: 128 MMHG

## 2021-06-14 DIAGNOSIS — I73.9 PVD (PERIPHERAL VASCULAR DISEASE) WITH CLAUDICATION (HCC): Primary | ICD-10-CM

## 2021-06-14 DIAGNOSIS — I35.9 AORTIC VALVE CALCIFICATION: Chronic | ICD-10-CM

## 2021-06-14 DIAGNOSIS — R93.1 ABNORMAL ECHOCARDIOGRAM: ICD-10-CM

## 2021-06-14 PROCEDURE — 99204 OFFICE O/P NEW MOD 45 MIN: CPT | Performed by: INTERNAL MEDICINE

## 2021-06-14 NOTE — PROGRESS NOTES
Subjective:     Encounter Date:06/14/21      Patient ID: Mariela Hopkins is a 87 y.o. female.    Chief Complaint:  History of Present Illness    Dear Dr. Mccray,    I had the pleasure of seeing this patient in the office today for initial evaluation and consultation.  I appreciate that you sent her in to see us.  They come in today to be seen for follow-up after an abnormal echocardiogram.    Patient recently had an echocardiogram that demonstrated aortic valve calcification:  Results for orders placed during the hospital encounter of 04/16/21    Adult Transthoracic Echo Complete W/ Cont if Necessary Per Protocol    Interpretation Summary  · Calculated left ventricular EF = 71% Estimated left ventricular EF was in agreement with the calculated left ventricular EF. Left ventricular systolic function is hyperdynamic (EF > 70%). Normal left ventricular cavity size and wall thickness noted. All left ventricular wall segments contract normally. Left ventricular diastolic function is consistent with (grade I) impaired relaxation.  · Small patent foramen ovale present.  · The aortic valve is not well visualized. The aortic valve is abnormal in structure. There is mild calcification of the aortic valve. There is mild to moderate thickening of the aortic valve. Trace to mild aortic valve regurgitation is present. No hemodynamically significant aortic valve stenosis is present.    Patient denies any chest pain or chest discomfort.  No shortness of breath.  She has a lot of problems with essential tremor.  She has been diagnosed in the past with neuropathy of her lower extremities.  She also has discoloration in her feet at times.  She says she notices more in the morning when she wakes up than at other times.  She does get pain in her legs if she walks any distance but feels that this is due to her neuropathy.    She had an EKG on March 2 in your office that showed some PVCs so she had a Holter monitor that showed no  "abnormality.    The following portions of the patient's history were reviewed and updated as appropriate: allergies, current medications, past family history, past medical history, past social history, past surgical history and problem list.    Procedures       Objective:     Vitals:    06/14/21 0931   BP: 128/80   BP Location: Left arm   Pulse: 68   Weight: 70.3 kg (155 lb)   Height: 157.5 cm (62\")     Body mass index is 28.35 kg/m².      Vitals reviewed.   Constitutional:       General: Not in acute distress.     Appearance: Well-developed. Not diaphoretic.   Eyes:      General:         Right eye: No discharge.         Left eye: No discharge.      Conjunctiva/sclera: Conjunctivae normal.      Pupils: Pupils are equal, round, and reactive to light.   HENT:      Head: Normocephalic and atraumatic.      Nose: Nose normal.   Neck:      Thyroid: No thyromegaly.      Trachea: No tracheal deviation.      Lymphadenopathy: No cervical adenopathy.   Pulmonary:      Effort: Pulmonary effort is normal. No respiratory distress.      Breath sounds: Normal breath sounds. No stridor.   Chest:      Chest wall: Not tender to palpatation.   Cardiovascular:      Normal rate. Regular rhythm.      Murmurs: There is a harsh midsystolic murmur at the URSB, radiating to the neck.      . No S3 gallop. No click. No rub.   Pulses:     Intact distal pulses.   Edema:     Peripheral edema absent.   Abdominal:      General: Bowel sounds are normal. There is no distension.      Palpations: Abdomen is soft. There is no abdominal mass.      Tenderness: There is no abdominal tenderness. There is no guarding or rebound.   Musculoskeletal: Normal range of motion.         General: No tenderness or deformity.      Cervical back: Normal range of motion and neck supple. Skin:     General: Skin is warm and dry.      Findings: No erythema or rash.   Neurological:      Mental Status: Alert and oriented to person, place, and time.      Deep Tendon Reflexes: " Reflexes are normal and symmetric.   Psychiatric:         Thought Content: Thought content normal.         Data and records reviewed:     Lab Results   Component Value Date    PROTENTOTREF 7.3 04/14/2021    ALBUMIN 3.7 04/14/2021    LABIL2 1.0 04/14/2021     No results found for: CHOL  No results found for: TRIG  No results found for: HDL  No results found for: LDL  No results found for: VLDL  No results found for: LDLHDL    No radiology results for the last 90 days.  Results for orders placed during the hospital encounter of 04/16/21    Adult Transthoracic Echo Complete W/ Cont if Necessary Per Protocol    Interpretation Summary  · Calculated left ventricular EF = 71% Estimated left ventricular EF was in agreement with the calculated left ventricular EF. Left ventricular systolic function is hyperdynamic (EF > 70%). Normal left ventricular cavity size and wall thickness noted. All left ventricular wall segments contract normally. Left ventricular diastolic function is consistent with (grade I) impaired relaxation.  · Small patent foramen ovale present.  · The aortic valve is not well visualized. The aortic valve is abnormal in structure. There is mild calcification of the aortic valve. There is mild to moderate thickening of the aortic valve. Trace to mild aortic valve regurgitation is present. No hemodynamically significant aortic valve stenosis is present.          Assessment:          Diagnosis Plan   1. PVD (peripheral vascular disease) with claudication (CMS/HCC)  Doppler Ankle Brachial Index Single Level CAR   2. Abnormal echocardiogram     3. Aortic valve calcification            Plan:       1.  Abnormal echocardiogram with aortic valve calcification, no significant stenosis, trace aortic regurgitation is seen, we will plan on seeing her back in 3 years and we will probably repeat an echocardiogram at that time  2.  Patient reports discoloration of both feet and she also has pain in her legs when she walks.   Is been thought that this is secondary to peripheral neuropathy.  I Ursula set her up for an ankle-brachial index to check for any concurrent peripheral vascular disease, this is never been checked.    Thank you very much for allowing us to participate in the care of this pleasant patient.  Please don't hesitate to call if I can be of assistance in any way.      Current Outpatient Medications:   •  Denosumab (PROLIA SC), Inject  under the skin into the appropriate area as directed Every 6 (Six) Months., Disp: , Rfl:   •  levETIRAcetam (Keppra) 500 MG tablet, Take 1 tablet by mouth 2 (two) times a day., Disp: 180 tablet, Rfl: 3  •  multivitamin with minerals tablet tablet, Take 1 tablet by mouth Daily., Disp: , Rfl:   •  propranolol (INDERAL) 20 MG tablet, Take 1 tablet by mouth 2 (Two) Times a Day. (Patient taking differently: Take 20 mg by mouth 3 (Three) Times a Day.), Disp: 180 tablet, Rfl: 1  •  vitamin C (ASCORBIC ACID) 250 MG tablet, Take 250 mg by mouth Daily., Disp: , Rfl:   •  vitamin D (ERGOCALCIFEROL) 1.25 MG (81791 UT) capsule capsule, Take 3,000 Units by mouth Daily., Disp: , Rfl:          No follow-ups on file.

## 2021-07-09 ENCOUNTER — APPOINTMENT (OUTPATIENT)
Dept: CARDIOLOGY | Facility: HOSPITAL | Age: 86
End: 2021-07-09

## 2021-07-09 ENCOUNTER — OFFICE VISIT (OUTPATIENT)
Dept: FAMILY MEDICINE CLINIC | Facility: CLINIC | Age: 86
End: 2021-07-09

## 2021-07-09 VITALS
BODY MASS INDEX: 29 KG/M2 | WEIGHT: 157.6 LBS | TEMPERATURE: 98 F | HEART RATE: 69 BPM | OXYGEN SATURATION: 95 % | HEIGHT: 62 IN | DIASTOLIC BLOOD PRESSURE: 70 MMHG | SYSTOLIC BLOOD PRESSURE: 122 MMHG

## 2021-07-09 DIAGNOSIS — R42 DIZZINESS: Primary | ICD-10-CM

## 2021-07-09 DIAGNOSIS — G25.0 ESSENTIAL TREMOR: ICD-10-CM

## 2021-07-09 DIAGNOSIS — J30.9 ALLERGIC RHINITIS, UNSPECIFIED SEASONALITY, UNSPECIFIED TRIGGER: ICD-10-CM

## 2021-07-09 DIAGNOSIS — R00.2 PALPITATIONS: ICD-10-CM

## 2021-07-09 DIAGNOSIS — R26.89 UNSTABLE BALANCE: ICD-10-CM

## 2021-07-09 DIAGNOSIS — R82.998 LEUKOCYTES IN URINE: ICD-10-CM

## 2021-07-09 DIAGNOSIS — R79.9 ABNORMAL FINDING OF BLOOD CHEMISTRY, UNSPECIFIED: ICD-10-CM

## 2021-07-09 DIAGNOSIS — H61.22 IMPACTED CERUMEN, LEFT EAR: ICD-10-CM

## 2021-07-09 DIAGNOSIS — Q23.9 ABNORMALITY OF AORTIC VALVE: ICD-10-CM

## 2021-07-09 DIAGNOSIS — G40.409 GRAND MAL SEIZURE DISORDER (HCC): ICD-10-CM

## 2021-07-09 LAB
BILIRUB BLD-MCNC: NEGATIVE MG/DL
CLARITY, POC: CLEAR
COLOR UR: YELLOW
GLUCOSE UR STRIP-MCNC: NEGATIVE MG/DL
KETONES UR QL: NEGATIVE
LEUKOCYTE EST, POC: ABNORMAL
NITRITE UR-MCNC: NEGATIVE MG/ML
PH UR: 6 [PH] (ref 5–8)
PROT UR STRIP-MCNC: NEGATIVE MG/DL
RBC # UR STRIP: ABNORMAL /UL
SP GR UR: 1.02 (ref 1–1.03)
UROBILINOGEN UR QL: NORMAL

## 2021-07-09 PROCEDURE — 69209 REMOVE IMPACTED EAR WAX UNI: CPT | Performed by: NURSE PRACTITIONER

## 2021-07-09 PROCEDURE — 81003 URINALYSIS AUTO W/O SCOPE: CPT | Performed by: NURSE PRACTITIONER

## 2021-07-09 PROCEDURE — 99214 OFFICE O/P EST MOD 30 MIN: CPT | Performed by: NURSE PRACTITIONER

## 2021-07-09 RX ORDER — CETIRIZINE HYDROCHLORIDE 5 MG/1
5 TABLET ORAL DAILY
Qty: 30 TABLET | Refills: 5 | Status: SHIPPED | OUTPATIENT
Start: 2021-07-09

## 2021-07-09 RX ORDER — FLUTICASONE PROPIONATE 50 MCG
2 SPRAY, SUSPENSION (ML) NASAL DAILY
Qty: 16 G | Refills: 5 | Status: SHIPPED | OUTPATIENT
Start: 2021-07-09 | End: 2022-01-03 | Stop reason: SDUPTHER

## 2021-07-09 NOTE — PROGRESS NOTES
"Chief Complaint  Dizziness    Subjective          Mariela Hopkins presents to [facility]  History of Present Illness       Patient presents today with daughter.  This is my first time seeing this patient.  She is a patient of Dr. Marla Mccray. She presents today with complaint of dizziness.  She reports this has been happening for years, but reports it has been worse since moving to [place] and in particular in the last 2-3 weeks.   I have reviewed patient health history and medications and she has several chronic conditions and is on several medications that could cause dizziness.    She denies any abdominal pain, nausea, vomiting, diarrhea, or constipation.     She does reports increase in nasal congestion and discharge, particularly in the morning.  This is when the dizziness is the worst.  She also reports that she increased the propranolol to three times daily.     She denies any issues with urination.        Objective   Vital Signs:   /70   Pulse 69   Temp 98 °F (36.7 °C)   Ht 157.5 cm (62\")   Wt 71.5 kg (157 lb 9.6 oz)   SpO2 95%   BMI 28.83 kg/m²       Physical Exam  Constitutional:       Appearance: Normal appearance.   HENT:      Head: Normocephalic and atraumatic.      Right Ear: There is impacted cerumen.      Left Ear: Tympanic membrane has decreased mobility.      Nose: Rhinorrhea present.      Right Sinus: No maxillary sinus tenderness or frontal sinus tenderness.      Left Sinus: No maxillary sinus tenderness or frontal sinus tenderness.      Mouth/Throat:      Lips: Pink.      Pharynx: Oropharynx is clear.      Tonsils: 0 on the right. 0 on the left.      Comments: Post nasal drainage noted  Cardiovascular:      Rate and Rhythm: Normal rate and regular rhythm.      Heart sounds: Murmur heard.     Neurological:      Mental Status: She is alert and oriented to person, place, and time.   Psychiatric:         Mood and Affect: Mood normal.         Behavior: " Behavior normal.         Thought Content: Thought content normal.         Judgment: Judgment normal.          Ear Cerumen Removal    Date/Time: 7/9/2021 4:13 PM  Performed by: Jett Tong APRN  Authorized by: Jett Tong APRN     Anesthesia:  Local Anesthetic: none  Location details: left ear  Patient tolerance: patient tolerated the procedure well with no immediate complications  Procedure type: irrigation   Sedation:  Patient sedated: no            Result Review :                 Assessment and Plan    Diagnoses and all orders for this visit:    1. Dizziness (Primary)  -     POCT urinalysis dipstick, automated  -     CBC & Differential  -     Comprehensive Metabolic Panel  -     Lipid panel    2. Essential tremor  -     CBC & Differential  -     Comprehensive Metabolic Panel  -     Lipid panel    3. Unstable balance  -     CBC & Differential  -     Comprehensive Metabolic Panel  -     Lipid panel    4. Palpitations  -     CBC & Differential  -     Comprehensive Metabolic Panel  -     Lipid panel    5. Abnormality of aortic valve  -     CBC & Differential  -     Comprehensive Metabolic Panel  -     Lipid panel    6. 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 Abnormal finding of blood chemistry, unspecified   -     Lipid panel    8. Leukocytes in urine  -     Urine Culture - Urine, Urine, Clean Catch    9. Impacted cerumen, left ear    10. Allergic rhinitis, unspecified seasonality, unspecified trigger    Other orders  -     fluticasone (Flonase) 50 MCG/ACT nasal spray; 2 sprays into the nostril(s) as directed by provider Daily.  Dispense: 16 g; Refill: 5  -     cetirizine (zyrTEC) 5 MG tablet; Take 1 tablet by mouth Daily.  Dispense: 30 tablet; Refill: 5      We discussed that dizziness is a very broad symptom and could be related to a myriad of disease processes.  Patient and mom verbalize understanding.     I am suspect  based on description and evaluation that part of it is related to allergies, fluid behind ears, and impacted cerumen.    Start allergy medication and if no improvement notify provider.     Will obtain labs and urine and call with results and any changes needed to plan of care.    Keep follow up with Neurology for further neurology assessment.  Reschedule vascular scan for further cardiology work up.    Follow up as needed.        Follow Up   No follow-ups on file.  Patient was given instructions and counseling regarding her condition or for health maintenance advice. Please see specific information pulled into the AVS if appropriate.

## 2021-07-10 LAB
ALBUMIN SERPL-MCNC: 4.2 G/DL (ref 3.6–4.6)
ALBUMIN/GLOB SERPL: 1.4 {RATIO} (ref 1.2–2.2)
ALP SERPL-CCNC: 46 IU/L (ref 48–121)
ALT SERPL-CCNC: 14 IU/L (ref 0–32)
AST SERPL-CCNC: 18 IU/L (ref 0–40)
BASOPHILS # BLD AUTO: 0 X10E3/UL (ref 0–0.2)
BASOPHILS NFR BLD AUTO: 0 %
BILIRUB SERPL-MCNC: 0.2 MG/DL (ref 0–1.2)
BUN SERPL-MCNC: 14 MG/DL (ref 8–27)
BUN/CREAT SERPL: 23 (ref 12–28)
CALCIUM SERPL-MCNC: 9.9 MG/DL (ref 8.7–10.3)
CHLORIDE SERPL-SCNC: 108 MMOL/L (ref 96–106)
CHOLEST SERPL-MCNC: 207 MG/DL (ref 100–199)
CO2 SERPL-SCNC: 22 MMOL/L (ref 20–29)
CREAT SERPL-MCNC: 0.62 MG/DL (ref 0.57–1)
EOSINOPHIL # BLD AUTO: 0.1 X10E3/UL (ref 0–0.4)
EOSINOPHIL NFR BLD AUTO: 2 %
ERYTHROCYTE [DISTWIDTH] IN BLOOD BY AUTOMATED COUNT: 12.2 % (ref 11.7–15.4)
GLOBULIN SER CALC-MCNC: 3.1 G/DL (ref 1.5–4.5)
GLUCOSE SERPL-MCNC: 110 MG/DL (ref 65–99)
HCT VFR BLD AUTO: 42.4 % (ref 34–46.6)
HDLC SERPL-MCNC: 58 MG/DL
HGB BLD-MCNC: 14.4 G/DL (ref 11.1–15.9)
IMM GRANULOCYTES # BLD AUTO: 0 X10E3/UL (ref 0–0.1)
IMM GRANULOCYTES NFR BLD AUTO: 0 %
LDLC SERPL CALC-MCNC: 120 MG/DL (ref 0–99)
LYMPHOCYTES # BLD AUTO: 2.6 X10E3/UL (ref 0.7–3.1)
LYMPHOCYTES NFR BLD AUTO: 35 %
MCH RBC QN AUTO: 31.2 PG (ref 26.6–33)
MCHC RBC AUTO-ENTMCNC: 34 G/DL (ref 31.5–35.7)
MCV RBC AUTO: 92 FL (ref 79–97)
MONOCYTES # BLD AUTO: 0.9 X10E3/UL (ref 0.1–0.9)
MONOCYTES NFR BLD AUTO: 13 %
NEUTROPHILS # BLD AUTO: 3.7 X10E3/UL (ref 1.4–7)
NEUTROPHILS NFR BLD AUTO: 50 %
PLATELET # BLD AUTO: 320 X10E3/UL (ref 150–450)
POTASSIUM SERPL-SCNC: 4.4 MMOL/L (ref 3.5–5.2)
PROT SERPL-MCNC: 7.3 G/DL (ref 6–8.5)
RBC # BLD AUTO: 4.61 X10E6/UL (ref 3.77–5.28)
SODIUM SERPL-SCNC: 143 MMOL/L (ref 134–144)
TRIGL SERPL-MCNC: 163 MG/DL (ref 0–149)
VLDLC SERPL CALC-MCNC: 29 MG/DL (ref 5–40)
WBC # BLD AUTO: 7.4 X10E3/UL (ref 3.4–10.8)

## 2021-07-11 LAB
BACTERIA UR CULT: NORMAL
BACTERIA UR CULT: NORMAL

## 2021-07-13 LAB
HBA1C MFR BLD: 6 % (ref 4.8–5.6)
WRITTEN AUTHORIZATION: NORMAL

## 2021-07-15 ENCOUNTER — OFFICE VISIT (OUTPATIENT)
Dept: NEUROLOGY | Facility: CLINIC | Age: 86
End: 2021-07-15

## 2021-07-15 VITALS
SYSTOLIC BLOOD PRESSURE: 102 MMHG | DIASTOLIC BLOOD PRESSURE: 70 MMHG | HEART RATE: 61 BPM | WEIGHT: 157 LBS | HEIGHT: 62 IN | OXYGEN SATURATION: 98 % | BODY MASS INDEX: 28.89 KG/M2

## 2021-07-15 DIAGNOSIS — R25.1 TREMOR: Primary | ICD-10-CM

## 2021-07-15 PROCEDURE — 99215 OFFICE O/P EST HI 40 MIN: CPT | Performed by: PSYCHIATRY & NEUROLOGY

## 2021-07-15 NOTE — PROGRESS NOTES
Subjective:     Patient ID: Mariela Hopkins is a 87 y.o. female.    Ms. Hopkins is an 87-year-old right-handed female with a history of a brain aneurysm status post surgery, essential tremor, osteoporosis cataract status post removal, skin cancer, epilepsy, gallstones, headaches, hearing loss, neuropathy, sleep apnea on CPAP, osteoporosis, and PRP who presents to the neurology clinic today as an established patient for follow-up.  The patient was last seen as a new patient on April 14, 2021.  For details regarding her history, please refer to that note.  In summary, the patient had a brain aneurysm rupture 50 years ago when she was pregnant and had her first seizure afterwards.  She had approximately 6 seizures and her last was 25 years ago.  She tried Dilantin and phenobarbital in the past.  Fortunately she has done well on levetiracetam 500 mg twice a day.  She had EEG in 2016 that showed right temporal slowing with a breach rhythm.  She had a head CT in 2012 that showed postsurgical changes with a right parietal clip.  She also has severe obstructive sleep apnea.  She had a home sleep study in 2017 that showed an AHI of 34.1.  She is on AutoPap 7-15.  At her last visit we did check some blood work for her neuropathy.  Everything came back okay.  Is likely from her chronic Dilantin use in the past.  The patient wanted to focus on her tremor today.  Tremors started about 8 years ago.  Tremors started in her legs.  Symptoms symmetric.  Not much tremor in her hands.  Worse in the morning.  Currently on propranolol 20 mg TID.  Increased from BID to TID a couple of months ago.  That helped symptoms.  No side effects to the medication.  This is the highest dose.  Has never tried anything else.  Symptoms are there with standing and walking.  Doesn't feel firm when she walks.  Sometimes uses a walker.  Sometimes feels like she is going to collapse.  Doesn't feel altogether.  Have gotten worse in the morning.  Some days  are better than others.  Cannot think of any particular triggers.  The extra propranolol helped.  No falls, but has a fear of falling.  Did PT since last visit, but graduated.      Family history: no  Day to day impairment: Limits walking  Orthostasis: no  Improved with EtOH? Not sure    The following portions of the patient's history were reviewed and updated as appropriate: allergies, current medications, past family history, past medical history, past social history, past surgical history and problem list.    Review of Systems     Objective:    Neurologic Exam    Physical Exam     Bilateral upper extremity tremor seen mainly with spirals.  Those are scanned in.  She does have some leg tremulousness when she stands.    Assessment/Plan:    The patient is an 87-year-old right-handed female who presents today for follow-up.    1.  Tremor-the patient's clinical presentation does seem consistent with essential tremor.  Fortunately her hand tremors are not limiting any activity.  She is mainly concerned about symptoms in her legs.  It is possible she may be experiencing some orthostatic tremor or maybe she is experiencing tremulousness from leg weakness from her neuropathy.  I do not feel comfortable increasing her propranolol today because her blood pressure is 102/70 and her heart rate is 61.  I feel at this point that her medical management is likely optimized.  She could consider elevating the head of bed at night.  We can also get a second opinion from a movement disorder specialist over at [place].    2.  Severe obstructive sleep apnea on AutoPap-we will send an order for new supplies to a local [facility].    3.  Structural epilepsy with focal onset seizures-fortunately she remains seizure-free without side effects to levetiracetam monotherapy.    A total of 50 minutes of time was spent on this encounter today.  This included reviewing patient's records, face-to-face time, and documentation.       Problems  Addressed this Visit     None      Visit Diagnoses     Tremor    -  Primary    Relevant Orders    Ambulatory Referral to Neurology (Completed)      Diagnoses       Codes Comments    Tremor    -  Primary ICD-10-CM: R25.1  ICD-9-CM: 781.0

## 2021-07-27 ENCOUNTER — TELEPHONE (OUTPATIENT)
Dept: FAMILY MEDICINE CLINIC | Facility: CLINIC | Age: 86
End: 2021-07-27

## 2021-07-27 DIAGNOSIS — G25.0 ESSENTIAL TREMOR: ICD-10-CM

## 2021-07-27 NOTE — TELEPHONE ENCOUNTER
aller: Mariela Hopkins    Relationship: Self    Best call back number: [phone]     Medication needed: propranolol (INDERAL) 20 MG tablet    When do you need the refill by: 07/27/21    What additional details did the patient provide when requesting the medication: PATIENT STATING SHE HAS ABOUT 2 DAYS LEFT    PATIENT WOULD LIKE TO KNOW IF SHE IS SUPPOSE TO BE USING THE FLONASE  TWICE A DAY WITH TWO SPRAYS IN EACH NOSTRIL.    Does the patient have less than a 3 day supply:  [x] Yes  [] No    What is the patient's preferred pharmacy: [facility]/PHARMACY #[id number] - [place], [place] - [address] [phone] [facility] [phone]

## 2021-07-27 NOTE — TELEPHONE ENCOUNTER
Please let the patient know that Flonase is an over-the-counter medication and should have the instructions on the bottle but the proper way of using it is 2 sprays into both nostrils ONCE daily

## 2021-07-30 ENCOUNTER — TELEPHONE (OUTPATIENT)
Dept: FAMILY MEDICINE CLINIC | Facility: CLINIC | Age: 86
End: 2021-07-30

## 2021-07-30 RX ORDER — PROPRANOLOL HYDROCHLORIDE 20 MG/1
20 TABLET ORAL 3 TIMES DAILY
Qty: 270 TABLET | Refills: 0 | Status: SHIPPED | OUTPATIENT
Start: 2021-07-30 | End: 2021-10-25

## 2021-07-30 NOTE — TELEPHONE ENCOUNTER
Pt is calling because last time you told her to go to 3 times a day for propranolol. The pharmacy needs a new script. Script will need to be sent to [facility] in [place], please advise.

## 2021-08-02 ENCOUNTER — HOSPITAL ENCOUNTER (OUTPATIENT)
Dept: CARDIOLOGY | Facility: HOSPITAL | Age: 86
Discharge: HOME | End: 2021-08-02
Admitting: INTERNAL MEDICINE

## 2021-08-02 DIAGNOSIS — I73.9 PVD (PERIPHERAL VASCULAR DISEASE) WITH CLAUDICATION (HCC): ICD-10-CM

## 2021-08-02 LAB
BH CV LOWER ARTERIAL LEFT ABI RATIO: 1
BH CV LOWER ARTERIAL LEFT DORSALIS PEDIS SYS MAX: 122 MMHG
BH CV LOWER ARTERIAL LEFT POST TIBIAL SYS MAX: 119 MMHG
BH CV LOWER ARTERIAL LEFT TBI RATIO: 0.96
BH CV LOWER ARTERIAL RIGHT ABI RATIO: 1
BH CV LOWER ARTERIAL RIGHT DORSALIS PEDIS SYS MAX: 124 MMHG
BH CV LOWER ARTERIAL RIGHT POST TIBIAL SYS MAX: 119 MMHG
BH CV LOWER ARTERIAL RIGHT TBI RATIO: 1.1
UPPER ARTERIAL LEFT ARM BRACHIAL SYS MAX: 120 MMHG
UPPER ARTERIAL RIGHT ARM BRACHIAL SYS MAX: 110 MMHG

## 2021-08-02 PROCEDURE — 93922 UPR/L XTREMITY ART 2 LEVELS: CPT

## 2021-08-03 ENCOUNTER — TELEPHONE (OUTPATIENT)
Dept: CARDIOLOGY | Facility: CLINIC | Age: 86
End: 2021-08-03

## 2021-08-03 NOTE — TELEPHONE ENCOUNTER
Attempted to contact patient. Voicemail left requesting a call back for the results. Will continue to try and reach patient.      Lula Lobo RN  Triage [facility]

## 2021-09-27 ENCOUNTER — OFFICE VISIT (OUTPATIENT)
Dept: FAMILY MEDICINE CLINIC | Facility: CLINIC | Age: 86
End: 2021-09-27

## 2021-09-27 VITALS
OXYGEN SATURATION: 98 % | DIASTOLIC BLOOD PRESSURE: 76 MMHG | HEART RATE: 76 BPM | BODY MASS INDEX: 28.41 KG/M2 | SYSTOLIC BLOOD PRESSURE: 132 MMHG | WEIGHT: 154.4 LBS | HEIGHT: 62 IN | TEMPERATURE: 98.4 F

## 2021-09-27 DIAGNOSIS — R73.09 ELEVATED GLUCOSE: Primary | ICD-10-CM

## 2021-09-27 DIAGNOSIS — R42 DIZZINESS: ICD-10-CM

## 2021-09-27 PROCEDURE — 99214 OFFICE O/P EST MOD 30 MIN: CPT | Performed by: FAMILY MEDICINE

## 2021-09-27 NOTE — PROGRESS NOTES
"Chief Complaint  Hyperglycemia and Dizziness    Subjective          Mariela Hopkins presents to [facility]  Patient is here today to discuss continued dizziness.  She saw IAIN Camp a few months ago was started on Zyrtec and fluticasone.  She states that her symptoms are better but she is still having dizziness.  It does seem to be positional specifically when she changes position or walks around.  It is not constant.  It is not associated with chest pain, palpitations, or shortness of breath.  She denies any chronic headaches, or ringing in her ears.    She is also here to follow-up on elevated blood glucose.  On labs in July 2021 her sugar was elevated and her hemoglobin A1c was 6.0.  She has been eating less sweets and carbs.  Other than above she is feeling fine.    Patient is here with her daughter Ana who supplies some the above history.        Objective   Vital Signs:   /76   Pulse 76   Temp 98.4 °F (36.9 °C)   Ht 157.5 cm (62\")   Wt 70 kg (154 lb 6.4 oz)   SpO2 98%   BMI 28.24 kg/m²     Physical Exam  Vitals and nursing note reviewed.   Constitutional:       Appearance: Normal appearance. She is well-developed.   HENT:      Head: Normocephalic and atraumatic.   Eyes:      General: No scleral icterus.     Conjunctiva/sclera: Conjunctivae normal.   Cardiovascular:      Rate and Rhythm: Normal rate and regular rhythm.      Heart sounds: Normal heart sounds.   Pulmonary:      Effort: Pulmonary effort is normal.      Breath sounds: Normal breath sounds.   Abdominal:      General: Bowel sounds are normal.      Palpations: Abdomen is soft.   Musculoskeletal:         General: Normal range of motion.      Cervical back: Normal range of motion and neck supple.      Right lower leg: No edema.      Left lower leg: No edema.   Skin:     General: Skin is warm and dry.      Capillary Refill: Capillary refill takes less than 2 seconds.      Findings: No rash. "   Neurological:      Mental Status: She is alert and oriented to person, place, and time.   Psychiatric:         Mood and Affect: Mood normal.         Behavior: Behavior normal.         Thought Content: Thought content normal.         Judgment: Judgment normal.        Result Review :   The following data was reviewed by: Marla Mccray DO on 09/27/2021:  Common labs    Common Labsle 4/14/21 7/9/21 7/9/21 7/9/21 7/9/21     1352 1352 1352 1352   Glucose   110 (A)     BUN   14     Creatinine   0.62     eGFR Non  Am   81     eGFR African Am   94     Sodium   143     Potassium   4.4     Chloride   108 (A)     Calcium   9.9     Total Protein 7.3  7.3     Albumin 3.7  4.2     Total Bilirubin   0.2     Alkaline Phosphatase   46 (A)     AST (SGOT)   18     ALT (SGPT)   14     WBC  7.4      Hemoglobin  14.4      Hematocrit  42.4      Platelets  320      Total Cholesterol    207 (A)    Triglycerides    163 (A)    HDL Cholesterol    58    LDL Cholesterol     120 (A)    Hemoglobin A1C     6.0 (A)   (A) Abnormal value       Comments are available for some flowsheets but are not being displayed.           TSH    TSH 4/14/21   TSH 0.796                     Assessment and Plan    Diagnoses and all orders for this visit:    1. Elevated glucose (Primary)  -     Comprehensive Metabolic Panel  -     Hemoglobin A1c    2. Dizziness  -     Ambulatory Referral to ENT (Otolaryngology)    Patient is here today to follow-up on persistent dizziness and elevated glucose.  Surveillance labs were obtained today and any medication changes will be made based on lab results and will be called to the patient later this week.  I would like the patient to be seen by ENT because her dizziness sounds like vertigo.    I spent 31 minutes caring for Mariela on this date of service. This time includes time spent by me in the following activities:preparing for the visit, reviewing tests, obtaining and/or reviewing a separately obtained history,  performing a medically appropriate examination and/or evaluation , counseling and educating the patient/family/caregiver, ordering medications, tests, or procedures and documenting information in the medical record  Follow Up   Return in about 6 weeks (around 11/10/2021) for Annual physical.  Patient was given instructions and counseling regarding her condition or for health maintenance advice. Please see specific information pulled into the AVS if appropriate.

## 2021-09-28 LAB
ALBUMIN SERPL-MCNC: 4.7 G/DL (ref 3.6–4.6)
ALBUMIN/GLOB SERPL: 1.9 {RATIO} (ref 1.2–2.2)
ALP SERPL-CCNC: 50 IU/L (ref 44–121)
ALT SERPL-CCNC: 12 IU/L (ref 0–32)
AST SERPL-CCNC: 17 IU/L (ref 0–40)
BILIRUB SERPL-MCNC: 0.3 MG/DL (ref 0–1.2)
BUN SERPL-MCNC: 13 MG/DL (ref 8–27)
BUN/CREAT SERPL: 21 (ref 12–28)
CALCIUM SERPL-MCNC: 10.1 MG/DL (ref 8.7–10.3)
CHLORIDE SERPL-SCNC: 104 MMOL/L (ref 96–106)
CO2 SERPL-SCNC: 25 MMOL/L (ref 20–29)
CREAT SERPL-MCNC: 0.63 MG/DL (ref 0.57–1)
GLOBULIN SER CALC-MCNC: 2.5 G/DL (ref 1.5–4.5)
GLUCOSE SERPL-MCNC: 78 MG/DL (ref 65–99)
HBA1C MFR BLD: 6.1 % (ref 4.8–5.6)
POTASSIUM SERPL-SCNC: 4.5 MMOL/L (ref 3.5–5.2)
PROT SERPL-MCNC: 7.2 G/DL (ref 6–8.5)
SODIUM SERPL-SCNC: 142 MMOL/L (ref 134–144)

## 2021-10-05 ENCOUNTER — TELEPHONE (OUTPATIENT)
Dept: NEUROLOGY | Facility: CLINIC | Age: 86
End: 2021-10-05

## 2021-10-05 NOTE — TELEPHONE ENCOUNTER
ORDERS FOR CPAP SUPPLIES REFAXED TO [facility] WITH ALL REQUIRED DOCUMENTS

## 2021-10-05 NOTE — TELEPHONE ENCOUNTER
Provider: RIC    Caller: JASMIN    Phone Number: [phone]    Reason for Call:   CURRENTLY EST W/ DR. LARIOS FOR TREMOR AND NEUROPATHY - MOVED FROM [place] AND HAS CPAP MACHINE - NEEDING MORE SUPPLIES. CAN I SCHED THIS AS A NEW PATIENT SLEEP OR DO I NEED TO GET REFERRAL FROM DR. BAE FIRST? PLEASE ADVISE    When was the patient last seen: 9-2

## 2021-10-05 NOTE — TELEPHONE ENCOUNTER
Patient states that she never got any supplies for cpap back in July. I let her know that supplies order was faxed to [facility] [place] again. Please advise. Patient asking for a call back. She said okay to leave detailed message.

## 2021-10-05 NOTE — TELEPHONE ENCOUNTER
SPOKE WITH PATIENT AND EXPLAINED TO HER THAT I AM NOT SURE WHAT HAPPENED TO HER ORDER IN July, HOWEVER, I HAVE RESENT HER ORDERS AND SHE WILL BE CONTACTED BY DME COMPANY    PATIENT VOICED UNDERSTANDING

## 2021-10-24 DIAGNOSIS — G25.0 ESSENTIAL TREMOR: ICD-10-CM

## 2021-10-25 RX ORDER — PROPRANOLOL HYDROCHLORIDE 20 MG/1
TABLET ORAL
Qty: 270 TABLET | Refills: 0 | Status: SHIPPED | OUTPATIENT
Start: 2021-10-25 | End: 2022-01-03 | Stop reason: SDUPTHER

## 2021-11-01 ENCOUNTER — TELEPHONE (OUTPATIENT)
Dept: FAMILY MEDICINE CLINIC | Facility: CLINIC | Age: 86
End: 2021-11-01

## 2021-11-01 NOTE — TELEPHONE ENCOUNTER
Caller: Mariela Hopkins    Relationship: Self    Best call back number:     What is the best time to reach you: 8AM, 12PM AND 5:30PM NOT GOOD. ANY OTHER TIMES OK    Who are you requesting to speak with (clinical staff, provider,  specific staff member):     Do you know the name of the person who called:     What was the call regarding: PATIENT HAD TO CALL IN TO RESCHEDULE HER PHYSICAL, SHE WANTS TO KNOW IF THE NEXT AVAILABLE APPOINTMENT IN January IS OK FOR HER TO DO    Do you require a callback: YES

## 2021-11-02 NOTE — TELEPHONE ENCOUNTER
LMOV LETTING PT KNOW IF SHE WANTED TO BE SEEN SOONER TO CALL US BACK IF NOT SHE WAS GOOD FOR 1/3/22

## 2021-12-25 NOTE — TELEPHONE ENCOUNTER
Lm for patient to call me back, Dr Elder sent order for CPAP supplies for her in July, needing to know if she just needs more supplies.  She does not need to be seem to get supplies, she is not due to be seem until January   foreign body, ear

## 2022-01-03 ENCOUNTER — OFFICE VISIT (OUTPATIENT)
Dept: FAMILY MEDICINE CLINIC | Facility: CLINIC | Age: 87
End: 2022-01-03

## 2022-01-03 VITALS
BODY MASS INDEX: 28.45 KG/M2 | SYSTOLIC BLOOD PRESSURE: 126 MMHG | HEIGHT: 62 IN | TEMPERATURE: 96.8 F | OXYGEN SATURATION: 94 % | HEART RATE: 64 BPM | WEIGHT: 154.6 LBS | DIASTOLIC BLOOD PRESSURE: 62 MMHG

## 2022-01-03 DIAGNOSIS — Z00.00 MEDICARE ANNUAL WELLNESS VISIT, SUBSEQUENT: Primary | ICD-10-CM

## 2022-01-03 DIAGNOSIS — M81.0 AGE-RELATED OSTEOPOROSIS WITHOUT CURRENT PATHOLOGICAL FRACTURE: ICD-10-CM

## 2022-01-03 DIAGNOSIS — Z78.0 MENOPAUSE: ICD-10-CM

## 2022-01-03 DIAGNOSIS — G25.0 ESSENTIAL TREMOR: ICD-10-CM

## 2022-01-03 PROCEDURE — 1170F FXNL STATUS ASSESSED: CPT | Performed by: FAMILY MEDICINE

## 2022-01-03 PROCEDURE — G0439 PPPS, SUBSEQ VISIT: HCPCS | Performed by: FAMILY MEDICINE

## 2022-01-03 PROCEDURE — 1159F MED LIST DOCD IN RCRD: CPT | Performed by: FAMILY MEDICINE

## 2022-01-03 RX ORDER — FLUTICASONE PROPIONATE 50 MCG
2 SPRAY, SUSPENSION (ML) NASAL DAILY
Qty: 16 G | Refills: 5 | Status: SHIPPED | OUTPATIENT
Start: 2022-01-03 | End: 2022-08-08

## 2022-01-03 RX ORDER — PROPRANOLOL HYDROCHLORIDE 20 MG/1
20 TABLET ORAL 3 TIMES DAILY
Qty: 270 TABLET | Refills: 1 | Status: SHIPPED | OUTPATIENT
Start: 2022-01-03 | End: 2022-07-15

## 2022-01-03 NOTE — PROGRESS NOTES
The ABCs of the Annual Wellness Visit  Subsequent Medicare Wellness Visit    Chief Complaint   Patient presents with   • Annual Exam     annual medicare wellness        Patient has a history of osteoporosis.  She was on Fosamax for about 20 years and then it was discontinued for 6 to 8 years.  Repeat DEXA scan showed worsening osteoporosis and she started Prolia at that time.  That was about a year ago.  She had 2 doses and is currently due for her third dose of Prolia.  Patient states that unless her bone density is significantly worse she would like to stop Prolia injections at this time.  Patient has broken each ankle in the past 5 years due to a trip and fall on 2 separate occasions.    Subjective    History of Present Illness:  Mariela Hopkins is a 88 y.o. female who presents for a Subsequent Medicare Wellness Visit.    The following portions of the patient's history were reviewed and   updated as appropriate: allergies, current medications, past family history, past medical history, past social history, past surgical history and problem list.    Compared to one year ago, the patient feels her physical   health is the same.    Compared to one year ago, the patient feels her mental   health is the same.    Recent Hospitalizations:  She was not admitted to the hospital during the last year.       Current Medical Providers:  Patient Care Team:  Marla Mccray,  as PCP - General (Family Medicine)    Outpatient Medications Prior to Visit   Medication Sig Dispense Refill   • cetirizine (zyrTEC) 5 MG tablet Take 1 tablet by mouth Daily. (Patient taking differently: Take 10 mg by mouth Daily.) 30 tablet 5   • Denosumab (PROLIA SC) Inject  under the skin into the appropriate area as directed Every 6 (Six) Months.     • levETIRAcetam (Keppra) 500 MG tablet Take 1 tablet by mouth 2 (two) times a day. 180 tablet 3   • multivitamin with minerals tablet tablet Take 1 tablet by mouth Daily.     • Polyethylene Glycol 3350  "(MIRALAX PO) Take  by mouth.     • vitamin C (ASCORBIC ACID) 250 MG tablet Take 250 mg by mouth Daily.     • vitamin D (ERGOCALCIFEROL) 1.25 MG (71344 UT) capsule capsule Take 3,000 Units by mouth Daily.     • fluticasone (Flonase) 50 MCG/ACT nasal spray 2 sprays into the nostril(s) as directed by provider Daily. 16 g 5   • propranolol (INDERAL) 20 MG tablet TAKE 1 TABLET BY MOUTH THREE TIMES A  tablet 0     No facility-administered medications prior to visit.       No opioid medication identified on active medication list. I have reviewed chart for other potential  high risk medication/s and harmful drug interactions in the elderly.          Aspirin is not on active medication list.  Aspirin use is not indicated based on review of current medical condition/s. Risk of harm outweighs potential benefits.  .    Patient Active Problem List   Diagnosis   • Aortic valve calcification     Advance Care Planning  Advance Directive is on file.  ACP discussion was held with the patient during this visit. Patient has an advance directive in EMR which is still valid.           Objective    Vitals:    01/03/22 1523   BP: 126/62   BP Location: Left arm   Patient Position: Sitting   Pulse: 64   Temp: 96.8 °F (36 °C)   SpO2: 94%   Weight: 70.1 kg (154 lb 9.6 oz)   Height: 157.5 cm (62.01\")     BMI Readings from Last 1 Encounters:   01/03/22 28.27 kg/m²   BMI is above normal parameters. Recommendations include: educational material, exercise counseling and nutrition counseling    Does the patient have evidence of cognitive impairment? No    Physical Exam  Vitals and nursing note reviewed.   Constitutional:       Appearance: She is well-developed.   HENT:      Head: Normocephalic and atraumatic.      Right Ear: External ear normal.      Left Ear: External ear normal.      Nose: Nose normal.   Eyes:      General: No scleral icterus.     Conjunctiva/sclera: Conjunctivae normal.   Cardiovascular:      Rate and Rhythm: Normal rate " "and regular rhythm.      Heart sounds: Normal heart sounds.   Pulmonary:      Effort: Pulmonary effort is normal.      Breath sounds: Normal breath sounds.   Musculoskeletal:      Cervical back: Normal range of motion and neck supple.   Lymphadenopathy:      Cervical: No cervical adenopathy.   Skin:     General: Skin is warm and dry.      Findings: No rash.   Neurological:      Mental Status: She is alert and oriented to person, place, and time.   Psychiatric:         Mood and Affect: Mood normal.         Behavior: Behavior normal.         Thought Content: Thought content normal.         Judgment: Judgment normal.                 HEALTH RISK ASSESSMENT    Smoking Status:  Social History     Tobacco Use   Smoking Status Never Smoker   Smokeless Tobacco Never Used   Tobacco Comment    caffeine use- \"occ\"     Alcohol Consumption:  Social History     Substance and Sexual Activity   Alcohol Use Yes    Comment: beer,wine     Fall Risk Screen:    STEADI Fall Risk Assessment was completed, and patient is at LOW risk for falls.Assessment completed on:1/3/2022    Depression Screening:  PHQ-2/PHQ-9 Depression Screening 1/3/2022   Little interest or pleasure in doing things 0   Feeling down, depressed, or hopeless 0   Total Score 0       Health Habits and Functional and Cognitive Screening:  Functional & Cognitive Status 1/3/2022   Do you have difficulty preparing food and eating? No   Do you have difficulty bathing yourself, getting dressed or grooming yourself? No   Do you have difficulty using the toilet? -   Do you have difficulty moving around from place to place? No   Do you have trouble with steps or getting out of a bed or a chair? No   Current Diet Well Balanced Diet   Dental Exam Up to date   Eye Exam Up to date   Exercise (times per week) 7 times per week   Current Exercises Include Walking;Stationary Bicycling/Spin Class   Do you need help using the phone?  No   Are you deaf or do you have serious difficulty " hearing?  No   Do you need help with transportation? No   Do you need help shopping? No   Do you need help preparing meals?  No   Do you need help with housework?  No   Do you need help with laundry? No   Do you need help taking your medications? No   Do you need help managing money? No   Do you ever drive or ride in a car without wearing a seat belt? No   Have you felt unusual stress, anger or loneliness in the last month? No   Who do you live with? Community   If you need help, do you have trouble finding someone available to you? No   Have you been bothered in the last four weeks by sexual problems? No       Age-appropriate Screening Schedule:  Refer to the list below for future screening recommendations based on patient's age, sex and/or medical conditions. Orders for these recommended tests are listed in the plan section. The patient has been provided with a written plan.    Health Maintenance   Topic Date Due   • DXA SCAN  Never done   • ZOSTER VACCINE (1 of 2) Never done   • TDAP/TD VACCINES (2 - Td or Tdap) 06/01/2029   • INFLUENZA VACCINE  Completed              Assessment/Plan   CMS Preventative Services Quick Reference  Risk Factors Identified During Encounter  Immunizations Discussed/Encouraged (specific Immunizations; Influenza, Pneumococcal 23, Shingrix and COVID19  Obesity/Overweight   The above risks/problems have been discussed with the patient.  Follow up actions/plans if indicated are seen below in the Assessment/Plan Section.  Pertinent information has been shared with the patient in the After Visit Summary.    Diagnoses and all orders for this visit:    1. Medicare annual wellness visit, subsequent (Primary)    2. Age-related osteoporosis without current pathological fracture  -     DEXA Bone Density Axial    3. Menopause  -     DEXA Bone Density Axial    4. Essential tremor  -     propranolol (INDERAL) 20 MG tablet; Take 1 tablet by mouth 3 (Three) Times a Day.  Dispense: 270 tablet; Refill:  1    Other orders  -     fluticasone (Flonase) 50 MCG/ACT nasal spray; 2 sprays into the nostril(s) as directed by provider Daily.  Dispense: 16 g; Refill: 5    Patient feels that if her bone density is stable or just slightly worse she would prefer not to continue Prolia at this time.  She is at low risk for fall at this time and I will honor her wishes.    Follow Up:   Return in about 5 months (around 6/8/2022) for Tremor, Elevated Glucose.     An After Visit Summary and PPPS were made available to the patient.

## 2022-01-12 ENCOUNTER — TELEPHONE (OUTPATIENT)
Dept: NEUROLOGY | Facility: CLINIC | Age: 87
End: 2022-01-12

## 2022-01-12 NOTE — TELEPHONE ENCOUNTER
LM FOR PATIENT, NEEDING TO KNOW IF SHE DID GET HER CPAP MACHINE AND IF SO WHAT COMPANY DID SHE GET IT FROM SO I CAN GET A COMPLIANCE REPORT

## 2022-01-13 ENCOUNTER — TELEPHONE (OUTPATIENT)
Dept: NEUROLOGY | Facility: CLINIC | Age: 87
End: 2022-01-13

## 2022-01-13 NOTE — TELEPHONE ENCOUNTER
She is a follow up scheduled for 60 minutes on Monday- did you request she have a 60 min follow up ?

## 2022-01-17 ENCOUNTER — OFFICE VISIT (OUTPATIENT)
Dept: NEUROLOGY | Facility: CLINIC | Age: 87
End: 2022-01-17

## 2022-01-17 VITALS
HEIGHT: 62 IN | SYSTOLIC BLOOD PRESSURE: 138 MMHG | HEART RATE: 80 BPM | BODY MASS INDEX: 28.6 KG/M2 | DIASTOLIC BLOOD PRESSURE: 78 MMHG | OXYGEN SATURATION: 98 % | WEIGHT: 155.4 LBS

## 2022-01-17 DIAGNOSIS — G40.209 PARTIAL SYMPTOMATIC EPILEPSY WITH COMPLEX PARTIAL SEIZURES, NOT INTRACTABLE, WITHOUT STATUS EPILEPTICUS: Primary | ICD-10-CM

## 2022-01-17 DIAGNOSIS — G47.33 OBSTRUCTIVE SLEEP APNEA: ICD-10-CM

## 2022-01-17 PROCEDURE — 99214 OFFICE O/P EST MOD 30 MIN: CPT | Performed by: PSYCHIATRY & NEUROLOGY

## 2022-01-17 RX ORDER — LEVETIRACETAM 500 MG/1
500 TABLET ORAL 2 TIMES DAILY
Qty: 180 TABLET | Refills: 3 | Status: SHIPPED | OUTPATIENT
Start: 2022-01-17 | End: 2023-01-13

## 2022-01-17 NOTE — PROGRESS NOTES
Subjective:     Patient ID: Mariela Hopkins is a 88 y.o. female.    The patient is an 88-year-old right-handed female with a history of a brain aneurysm status post surgery, tremor, osteoporosis, cataracts, skin cancer, epilepsy, gallstones, headaches, hearing loss, neuropathy, sleep apnea, osteoporosis and PRP who presents to the neurology clinic today as established patient for follow-up.  The patient was last seen on July 15, 2021.  The patient had a brain aneurysm rupture 50 years ago when she was pregnant and had her first seizure afterwards.  She has had about 6 seizures in her life and her last one was 25 years ago.  She was on Dilantin and phenobarbital in the past.  Dilantin caused her to develop a neuropathy.  She is currently on levetiracetam 500 mg twice a day.  She also has severe obstructive sleep apnea.  Her home sleep study in 2017 showed an AHI of 34.1.  She is currently on AutoPap.  Since I last saw her she was evaluated in the movement disorder subspecialty clinics over at [place] on September 2, 2021.  They diagnosed her with orthostatic tremor.  The patient denies any seizures since I last saw her.  She denies any side effects to her medication reports that they are affordable.    Mask type: Full face mask, tapes mouth shut to help with sore throat   Comfortable? It's alright   Major leak? A little   Still snoring? Not sure  DME company: [employer]  Cleaning equipment? Has a SoClean device and weekly soaks the humidifier and mask in vinegar and water.  Compliance: She has used her machine over 4 hours 89% of the time.  She is mainly using pressures between 12 and 13 with a residual AHI of 1.7.   Naps? No scheduled naps    ESS:  1. Sitting and reading? 0  2. Watching TV? 0  3. Sitting inactive in a public place? 0  4. As a passenger in a car for an hour without a break? 0  5. Lying down to rest in the afternoon when able? 1  6. Sitting and talking to someone? 0  7. Sitting quietly after lunch  without EtOH? 2  8. In a car while stopped for a few minutes in traffic? 0  Total: 3    The following portions of the patient's history were reviewed and updated as appropriate: allergies, current medications, past family history, past medical history, past social history, past surgical history and problem list.    Review of Systems     Objective:    Neurologic Exam    Physical Exam    Assessment/Plan:    The patient is an 88-year-old right-handed female with history of a brain aneurysm, tremor, osteoporosis, cataracts, skin cancer, epilepsy, gallstones, headaches, hearing loss, neuropathy, sleep apnea, osteoporosis and PRP who presents today for follow-up.    1.  Epilepsy-fortunately the patient remains seizure-free without side effects to levetiracetam monotherapy.  I recommend continuing on the current dose with no changes.    2.  Severe obstructive sleep apnea on AutoPap-overall the patient is doing well without any major issues.  I recommend continuing on the current settings with no changes.    3.  Tremor-patient likely has essential tremor and orthostatic tremor.  Unfortunately she is on probably the maximum dose of propranolol.  The movement disorder clinic over at [place] did not feel like medications like primidone would be beneficial.    4.  Neuropathy-likely from chronic Dilantin use.    A total of 30 minutes of time was spent on this encounter today.  This includes reviewing the patient's records, face-to-face time, documentation.       Problems Addressed this Visit     None      Visit Diagnoses     Partial symptomatic epilepsy with complex partial seizures, not intractable, without status epilepticus (HCC)    -  Primary    Relevant Medications    levETIRAcetam (Keppra) 500 MG tablet    Obstructive sleep apnea          Diagnoses       Codes Comments    Partial symptomatic epilepsy with complex partial seizures, not intractable, without status epilepticus (HCC)    -  Primary ICD-10-CM: G40.209  ICD-9-CM:  345.40     Obstructive sleep apnea     ICD-10-CM: G47.33  ICD-9-CM: 327.23

## 2022-01-17 NOTE — PATIENT INSTRUCTIONS
[facility]  Marianna Elder MD  Neurology clinic  [phone]    With anti-seizure medications, you may initially notice side effects of fatigue, drowsiness, unsteadiness, and dizziness.  Other possible side effects include nausea, abdominal pain, headache, blurry or double vision, slurred speech and mood changes.  Generally, patients will noticed these symptoms when the medication is first started or with higher doses and will go away with time.    It is import to consistently take your medication every day.  Missing just one dose may put you at risk for a breakthrough seizure.  Consider using reminders on your phone or a pill box.    If you develop a rash, please call the neurology clinic immediately or notify another healthcare professional, as this may be potentially life-threatening.  If you are unable to reach a healthcare professional, go to the emergency room immediately for further evaluation.    If you develop thoughts of wanting to hurt yourself or others, please call the neurology clinic immediately to notify another healthcare professional.  If you are unable to reach a healthcare professional, go to the emergency room immediately for further evaluation.    Taking anti-seizure medications may increase the risk of birth defects.  If you are a female of child-bearing potential, it is recommended that you take folic acid (1-4 mg) daily.  This may reduce the risk of birth defects while pregnant and taking seizure medication.  If you become pregnant, contact our office immediately. You will need to be followed very closely (at least monthly appointments).  I also recommend contacting The North American Antiepileptic Drug Pregnancy Registry at www.aedpregnancyregistry.org or 1-705.425.9933.    It is the [place] state law that you cannot drive within 90 days of a seizure.    You should avoid certain activities that if you were to have a seizure, you could harm yourself or others. In  general, it is recommended that you avoid operating heavy machinery or power tools, swimming or taking baths by yourself (showers are ok), don't stand over open flames, don't get on high ladders or the roof.  I also recommend to avoid sleeping on your stomach.    For further information on epilepsy and resources available to patients and their families, please visit the [facility] at www.efky.org or call [phone].    **Check out the [facility]'s monthly Art Group Gathering.  They are located at [facility], [address] [place] [address].  Call Lynne Ami at [phone] or email her at [email or web address] for the dates of future gatherings.**      **If you have having memory problems, consider HOBSCOTCH (Home-Based Self-management and Cognitive Training Changes lives).  It is an 8 week self-management program for adults with epilepsy and memory problems.  The program is free at the [facility].  Contact Dot Daugherty at [phone] or [email or web address].**    Check out My Epilepsy Story (MyEpilepsyStory.org).  Their goal is to foster the growth of young girls and women affected by epilepsy and encourage them not just to lives, but to THRIVE!  You can get involved by donating, sharing your story, or becoming an ambassador.  Services include educational resources and transportation.              Cleaning:    Masks and Nasal Pillows:  Wash once a day after use in the morning.  Use warm water and detergent with no lotion (Lary, Obdulio, etc) and rinse in warm water.  Allow to air dry.    Tubing:  Wash once a week with warm water and detergent with no lotion OR 1 part vinegar to 3 parts water.  Soak both inside and outside of the tubing and rinse in warm water.  Allow to air dry by hanging over shower crissy.    Headgear:  Can machine wash on gentle cycle with cold water or hand wash in mild detergent and rinse in cold water.  Allow to air dry.  Do not  take apart (may be hard to get back together!)    Humidifier chamber:  Empty daily and add 1 cup of fresh distilled or boiled water.  Wash weekly in warm soapy water or vinegar solution and rinse in warm water.    Changing of supplies:    Every 2 weeks:  New nasal cushion or pillows; change filter    Monthly:  New full face cushion    Every 3 months:  New mask frame, new tubing    Every 6 months:  New headgear, new humidifier chamber, new chinstrap

## 2022-03-02 ENCOUNTER — TELEPHONE (OUTPATIENT)
Dept: FAMILY MEDICINE CLINIC | Facility: CLINIC | Age: 87
End: 2022-03-02

## 2022-06-09 ENCOUNTER — HOSPITAL ENCOUNTER (OUTPATIENT)
Dept: BONE DENSITY | Facility: HOSPITAL | Age: 87
Discharge: HOME | End: 2022-06-09
Admitting: FAMILY MEDICINE

## 2022-06-09 PROCEDURE — 77080 DXA BONE DENSITY AXIAL: CPT

## 2022-07-15 DIAGNOSIS — G25.0 ESSENTIAL TREMOR: ICD-10-CM

## 2022-07-15 RX ORDER — PROPRANOLOL HYDROCHLORIDE 20 MG/1
TABLET ORAL
Qty: 270 TABLET | Refills: 1 | Status: SHIPPED | OUTPATIENT
Start: 2022-07-15 | End: 2022-07-29 | Stop reason: SDUPTHER

## 2022-07-29 ENCOUNTER — OFFICE VISIT (OUTPATIENT)
Dept: FAMILY MEDICINE CLINIC | Facility: CLINIC | Age: 87
End: 2022-07-29

## 2022-07-29 VITALS
HEART RATE: 65 BPM | SYSTOLIC BLOOD PRESSURE: 122 MMHG | BODY MASS INDEX: 29.26 KG/M2 | WEIGHT: 159 LBS | OXYGEN SATURATION: 97 % | TEMPERATURE: 97.3 F | HEIGHT: 62 IN | DIASTOLIC BLOOD PRESSURE: 82 MMHG

## 2022-07-29 DIAGNOSIS — G47.33 OBSTRUCTIVE SLEEP APNEA SYNDROME: ICD-10-CM

## 2022-07-29 DIAGNOSIS — Z79.899 ENCOUNTER FOR LONG-TERM (CURRENT) USE OF MEDICATIONS: ICD-10-CM

## 2022-07-29 DIAGNOSIS — R73.09 ELEVATED GLUCOSE: ICD-10-CM

## 2022-07-29 DIAGNOSIS — G25.0 ESSENTIAL TREMOR: Primary | ICD-10-CM

## 2022-07-29 PROCEDURE — 99214 OFFICE O/P EST MOD 30 MIN: CPT | Performed by: FAMILY MEDICINE

## 2022-07-29 RX ORDER — PROPRANOLOL HYDROCHLORIDE 20 MG/1
20 TABLET ORAL 3 TIMES DAILY
Qty: 270 TABLET | Refills: 1 | Status: SHIPPED | OUTPATIENT
Start: 2022-07-29 | End: 2023-01-17 | Stop reason: SDUPTHER

## 2022-07-29 RX ORDER — SACCHAROMYCES BOULARDII 250 MG
250 CAPSULE ORAL 2 TIMES DAILY
COMMUNITY
End: 2023-01-17

## 2022-07-29 NOTE — PROGRESS NOTES
"Chief Complaint  Results (Dexa scan results)    Subjective        Mariela Hopkins presents to [facility]  Patient is here to follow-up on essential tremor.  She states that she has a history of essential tremor. She takes propranolol 20 mg 2-3 times daily and it really helps throughout the day with her shakiness. She denies any side effects.      Seizure disorder.  Currently she is seeing Neurology who is also monitoring tremor as well as seizures.  She continues to take Keppra as directed.      GIOVANA.  Patient has a h/o GIOVANA and would like to follow with pulmonology.  She is requesting a referral      Objective   Vital Signs:  /82   Pulse 65   Temp 97.3 °F (36.3 °C)   Ht 157.5 cm (62\")   Wt 72.1 kg (159 lb)   SpO2 97%   BMI 29.08 kg/m²   Estimated body mass index is 29.08 kg/m² as calculated from the following:    Height as of this encounter: 157.5 cm (62\").    Weight as of this encounter: 72.1 kg (159 lb).          Physical Exam  Vitals and nursing note reviewed.   Constitutional:       Appearance: She is well-developed.   HENT:      Head: Normocephalic and atraumatic.      Right Ear: External ear normal.      Left Ear: External ear normal.      Nose: Nose normal.   Eyes:      General: No scleral icterus.     Conjunctiva/sclera: Conjunctivae normal.   Cardiovascular:      Rate and Rhythm: Normal rate and regular rhythm.      Heart sounds: Normal heart sounds.   Pulmonary:      Effort: Pulmonary effort is normal.      Breath sounds: Normal breath sounds.   Musculoskeletal:      Cervical back: Normal range of motion and neck supple.   Lymphadenopathy:      Cervical: No cervical adenopathy.   Skin:     General: Skin is warm and dry.      Findings: No rash.   Neurological:      Mental Status: She is alert and oriented to person, place, and time.   Psychiatric:         Mood and Affect: Mood normal.         Behavior: Behavior normal.         Thought Content: Thought content " normal.         Judgment: Judgment normal.        Result Review :  The following data was reviewed by: Marla Mccray DO on 07/29/2022:  Common labs    Common Labsle 9/27/21 9/27/21 7/29/22 7/29/22    1200 1200 1023 1023   Glucose 78  75    BUN 13  13    Creatinine 0.63  0.64    eGFR Non  Am 81      eGFR African Am 93      Sodium 142  142    Potassium 4.5  4.9    Chloride 104  105    Calcium 10.1  9.8    Total Protein 7.2  7.1    Albumin 4.7 (A)  4.4    Total Bilirubin 0.3  0.4    Alkaline Phosphatase 50  54    AST (SGOT) 17  18    ALT (SGPT) 12  11    WBC    6.8   Hemoglobin    13.9   Hematocrit    41.6   Platelets    299   Hemoglobin A1C  6.1 (A)     (A) Abnormal value       Comments are available for some flowsheets but are not being displayed.                         Assessment and Plan   Diagnoses and all orders for this visit:    1. Essential tremor (Primary)  -     propranolol (INDERAL) 20 MG tablet; Take 1 tablet by mouth 3 (Three) Times a Day.  Dispense: 270 tablet; Refill: 1    2. Obstructive sleep apnea syndrome  -     Ambulatory Referral to Pulmonology    3. Encounter for long-term (current) use of medications  -     Comprehensive Metabolic Panel  -     CBC & Differential    4. Elevated glucose  -     Comprehensive Metabolic Panel    Patient is here today to follow-up on essential tremor, obstructive sleep apnea, and elevated glucose. Surveillance labs were obtained today and any medication changes will be made based on lab results and will be called to the patient later this week.      Referral entered for pulmonology for GIOVANA.        Follow Up   Return in about 6 months (around 1/25/2023) for Medicare Wellness.  Patient was given instructions and counseling regarding her condition or for health maintenance advice. Please see specific information pulled into the AVS if appropriate.

## 2022-07-30 LAB
ALBUMIN SERPL-MCNC: 4.4 G/DL (ref 3.6–4.6)
ALBUMIN/GLOB SERPL: 1.6 {RATIO} (ref 1.2–2.2)
ALP SERPL-CCNC: 54 IU/L (ref 44–121)
ALT SERPL-CCNC: 11 IU/L (ref 0–32)
AST SERPL-CCNC: 18 IU/L (ref 0–40)
BASOPHILS # BLD AUTO: 0 X10E3/UL (ref 0–0.2)
BASOPHILS NFR BLD AUTO: 0 %
BILIRUB SERPL-MCNC: 0.4 MG/DL (ref 0–1.2)
BUN SERPL-MCNC: 13 MG/DL (ref 8–27)
BUN/CREAT SERPL: 20 (ref 12–28)
CALCIUM SERPL-MCNC: 9.8 MG/DL (ref 8.7–10.3)
CHLORIDE SERPL-SCNC: 105 MMOL/L (ref 96–106)
CO2 SERPL-SCNC: 25 MMOL/L (ref 20–29)
CREAT SERPL-MCNC: 0.64 MG/DL (ref 0.57–1)
EGFRCR SERPLBLD CKD-EPI 2021: 85 ML/MIN/1.73
EOSINOPHIL # BLD AUTO: 0.1 X10E3/UL (ref 0–0.4)
EOSINOPHIL NFR BLD AUTO: 2 %
ERYTHROCYTE [DISTWIDTH] IN BLOOD BY AUTOMATED COUNT: 12.7 % (ref 11.7–15.4)
GLOBULIN SER CALC-MCNC: 2.7 G/DL (ref 1.5–4.5)
GLUCOSE SERPL-MCNC: 75 MG/DL (ref 65–99)
HCT VFR BLD AUTO: 41.6 % (ref 34–46.6)
HGB BLD-MCNC: 13.9 G/DL (ref 11.1–15.9)
IMM GRANULOCYTES # BLD AUTO: 0 X10E3/UL (ref 0–0.1)
IMM GRANULOCYTES NFR BLD AUTO: 0 %
LYMPHOCYTES # BLD AUTO: 2.8 X10E3/UL (ref 0.7–3.1)
LYMPHOCYTES NFR BLD AUTO: 43 %
MCH RBC QN AUTO: 31.6 PG (ref 26.6–33)
MCHC RBC AUTO-ENTMCNC: 33.4 G/DL (ref 31.5–35.7)
MCV RBC AUTO: 95 FL (ref 79–97)
MONOCYTES # BLD AUTO: 1 X10E3/UL (ref 0.1–0.9)
MONOCYTES NFR BLD AUTO: 14 %
NEUTROPHILS # BLD AUTO: 2.8 X10E3/UL (ref 1.4–7)
NEUTROPHILS NFR BLD AUTO: 41 %
PLATELET # BLD AUTO: 299 X10E3/UL (ref 150–450)
POTASSIUM SERPL-SCNC: 4.9 MMOL/L (ref 3.5–5.2)
PROT SERPL-MCNC: 7.1 G/DL (ref 6–8.5)
RBC # BLD AUTO: 4.4 X10E6/UL (ref 3.77–5.28)
SODIUM SERPL-SCNC: 142 MMOL/L (ref 134–144)
WBC # BLD AUTO: 6.8 X10E3/UL (ref 3.4–10.8)

## 2022-08-08 RX ORDER — FLUTICASONE PROPIONATE 50 MCG
SPRAY, SUSPENSION (ML) NASAL
Qty: 16 G | Refills: 5 | Status: SHIPPED | OUTPATIENT
Start: 2022-08-08 | End: 2022-12-27

## 2022-12-27 RX ORDER — FLUTICASONE PROPIONATE 50 MCG
SPRAY, SUSPENSION (ML) NASAL
Qty: 16 G | Refills: 5 | Status: SHIPPED | OUTPATIENT
Start: 2022-12-27

## 2023-01-13 RX ORDER — LEVETIRACETAM 500 MG/1
TABLET ORAL
Qty: 180 TABLET | Refills: 3 | Status: SHIPPED | OUTPATIENT
Start: 2023-01-13

## 2023-01-17 ENCOUNTER — OFFICE VISIT (OUTPATIENT)
Dept: NEUROLOGY | Facility: CLINIC | Age: 88
End: 2023-01-17
Payer: MEDICARE

## 2023-01-17 VITALS
HEIGHT: 62 IN | SYSTOLIC BLOOD PRESSURE: 128 MMHG | DIASTOLIC BLOOD PRESSURE: 72 MMHG | OXYGEN SATURATION: 98 % | WEIGHT: 159.2 LBS | HEART RATE: 70 BPM | BODY MASS INDEX: 29.3 KG/M2

## 2023-01-17 DIAGNOSIS — G40.209 PARTIAL SYMPTOMATIC EPILEPSY WITH COMPLEX PARTIAL SEIZURES, NOT INTRACTABLE, WITHOUT STATUS EPILEPTICUS: Primary | ICD-10-CM

## 2023-01-17 DIAGNOSIS — G25.0 ESSENTIAL TREMOR: ICD-10-CM

## 2023-01-17 DIAGNOSIS — G62.9 PERIPHERAL POLYNEUROPATHY: ICD-10-CM

## 2023-01-17 PROCEDURE — 99215 OFFICE O/P EST HI 40 MIN: CPT | Performed by: PSYCHIATRY & NEUROLOGY

## 2023-01-17 RX ORDER — UREA 10 %
LOTION (ML) TOPICAL
COMMUNITY

## 2023-01-17 RX ORDER — PROPRANOLOL HYDROCHLORIDE 20 MG/1
20 TABLET ORAL 3 TIMES DAILY
Qty: 270 TABLET | Refills: 3 | Status: SHIPPED | OUTPATIENT
Start: 2023-01-17

## 2023-01-17 NOTE — LETTER
January 17, 2023     Marla Mccray DO  [address]  [address]  [facility] [address]    Patient: Mariela Hopkins   YOB: 1933   Date of Visit: 1/17/2023       Dear Dr. Mahendra DO:    Thank you for referring Mariela Hopikns to me for evaluation. Below are the relevant portions of my assessment and plan of care.    If you have questions, please do not hesitate to call me. I look forward to following Mariela along with you.         Sincerely,        Marianna Elder MD        CC: No Recipients  Marianna Elder MD  01/17/23 1423  Signed  Subjective    Mariela Hopkins is a 89 y.o. female is here today for follow-up.    History of Present Illness  The patient is an 89-year-old right-handed female with a history of a brain aneurysm status post surgery, tremor, osteoporosis, cataracts, skin cancer, epilepsy, gallstones, headaches, hearing loss, neuropathy, sleep apnea, osteoporosis, and PRP who presents to the neurology clinic today as an established patient for follow-up.  The patient was last seen on January 17, 2022.  The patient had a brain aneurysm rupture 50 years ago when she was pregnant and had her first seizure afterwards.  She has had about 6 seizures total and fortunately she has not had a seizure in over 25 years.  She was on Dilantin and phenobarbital in the past.  Unfortunately Dilantin caused a neuropathy.  She is currently on levetiracetam 500 mg twice a day.  She also has severe obstructive sleep apnea.  This was an issue that we covered in the past however she has now established care with another sleep physician and was last seen on August 24, 2022.  We also had her see a movement disorder specialist over at [facility] in September 2021 and they diagnosed her with orthostatic tremor.  The patient reports that she denies any seizures since her last visit.  She continues on levetiracetam without any major side effects.  She denies any seizures and reports that all of  her medications are free.  She does take propranolol 20 mg 3 times a day.  The mood disorder specialist did not recommend any changes to that.  She does live in an assisted living facility.  She utilizes a walker but did not bring it today.  She denies any falls.  She denies any pain in her feet.  Her PCP checked her circulation because her feet are cold but reports that is okay.  The patient reports that her tremor is all internal but it is more noticeable upon standing.  She reports that for the past 6 years she constantly feels lightheaded and disconnected.  She feels like she is not in control and that has worsened over time.  She thinks it might be related to her blood pressure.  She denies any associated double vision, nausea or vomiting, numbness or tingling, weakness, headache, syncope, falls.  Nothing makes his symptoms better or worse.  This was similar to when she was on both Dilantin and Keppra in the past.      The following portions of the patient's history were reviewed and updated as appropriate: allergies, current medications, past family history, past medical history, past social history, past surgical history and problem list.    Review of Systems    Objective    Physical Exam     **The patient is wearing a mask**  Constitutional:  Vital signs reviewed.  No apparent distress.  Well groomed.  Eyes:  No injection, no icterus.    Respiratory:  Normal effort.  Clear to auscultation bilaterally.  Cardiovascular:  Regular rate and rhythm.  No murmurs.  No carotid bruits. Symmetric radial pulses.  Musculoskeletal: Muscle tone is decreased in the distal bilateral lower extremities.  Strength is 5/5 in the bilateral upper and lower extremities proximally and distally unless otherwise specified in the neurological exam.  Skin:  No rashes.  Warm, dry, and intact.  Psychiatric:  Good mood.  Normal affect.    Neurologic:  Mental status-  The patient is alert and oriented to person, place and time.  Attention/concentration is within normal limits.  Speech is fluent without dysarthria.  The patient is able to name, repeat and follow complex commands without difficulty.  Immediate memory and delayed recall intact (3/3 words immediate and after 4 minutes).  Fund of knowledge normal.  Cranial nerves- Pupils equally round and reactive to light with intact accomodation.  The patient has a known left lower quadrant OPI.  Extraocular movements intact.  Facial sensation intact.   Hearing intact to finger-rub bilaterally.  SCM and trapezius are 5/5 bilaterally.    Motor-  See musculoskeletal above.  No tremor.  Reflexes- trace to absent in the bilateral biceps, brachioradialis, patellar and achilles.  Toes down-going bilaterally.  Sensation-decreased to vibration in the distal bilateral lower extremities  Coordination- Intact to finger tapping and heel knee shin bilaterally.     The patient's orthostatics are negative today.  The values are scanned in.        Assessment & Plan   Problems Addressed this Visit    None  Visit Diagnoses     Partial symptomatic epilepsy with complex partial seizures, not intractable, without status epilepticus (HCC)    -  Primary    Essential tremor        Relevant Medications    propranolol (INDERAL) 20 MG tablet    Peripheral polyneuropathy          Diagnoses       Codes Comments    Partial symptomatic epilepsy with complex partial seizures, not intractable, without status epilepticus (HCC)    -  Primary ICD-10-CM: G40.209  ICD-9-CM: 345.40     Essential tremor     ICD-10-CM: G25.0  ICD-9-CM: 333.1     Peripheral polyneuropathy     ICD-10-CM: G62.9  ICD-9-CM: 356.9         The patient is an 89-year-old right-handed female with history of a brain aneurysm status post surgery, tremor, osteoporosis, cataracts, skin cancer, epilepsy, gallstones, headaches, hearing loss, neuropathy, sleep apnea, osteoporosis who presents today for follow-up.    1.  Epilepsy-the patient likely has structural  epilepsy with focal onset seizures.  Fortunately she has been seizure-free without intolerable side effects to levetiracetam monotherapy.  I recommend continuing on the current dose with no changes.    2.  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  We will continue propranolol at the current dose with no changes.    3.  Severe obstructive sleep apnea on AutoPap-this is now managed by a different sleep physician.    4.  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    We will see the patient back in 1 year or sooner if needed.    A total of 40 minutes of time was spent on this encounter today.  This includes reviewing the patient's records, face-to-face time, and documentation.

## 2023-01-17 NOTE — PROGRESS NOTES
Subjective   Mariela Hopkins is a 89 y.o. female is here today for follow-up.    History of Present Illness  The patient is an 89-year-old right-handed female with a history of a brain aneurysm status post surgery, tremor, osteoporosis, cataracts, skin cancer, epilepsy, gallstones, headaches, hearing loss, neuropathy, sleep apnea, osteoporosis, and PRP who presents to the neurology clinic today as an established patient for follow-up.  The patient was last seen on January 17, 2022.  The patient had a brain aneurysm rupture 50 years ago when she was pregnant and had her first seizure afterwards.  She has had about 6 seizures total and fortunately she has not had a seizure in over 25 years.  She was on Dilantin and phenobarbital in the past.  Unfortunately Dilantin caused a neuropathy.  She is currently on levetiracetam 500 mg twice a day.  She also has severe obstructive sleep apnea.  This was an issue that we covered in the past however she has now established care with another sleep physician and was last seen on August 24, 2022.  We also had her see a movement disorder specialist over at [facility] in September 2021 and they diagnosed her with orthostatic tremor.  The patient reports that she denies any seizures since her last visit.  She continues on levetiracetam without any major side effects.  She denies any seizures and reports that all of her medications are free.  She does take propranolol 20 mg 3 times a day.  The mood disorder specialist did not recommend any changes to that.  She does live in an assisted living facility.  She utilizes a walker but did not bring it today.  She denies any falls.  She denies any pain in her feet.  Her PCP checked her circulation because her feet are cold but reports that is okay.  The patient reports that her tremor is all internal but it is more noticeable upon standing.  She reports that for the past 6 years she constantly feels lightheaded and disconnected.  She feels  like she is not in control and that has worsened over time.  She thinks it might be related to her blood pressure.  She denies any associated double vision, nausea or vomiting, numbness or tingling, weakness, headache, syncope, falls.  Nothing makes his symptoms better or worse.  This was similar to when she was on both Dilantin and Keppra in the past.      The following portions of the patient's history were reviewed and updated as appropriate: allergies, current medications, past family history, past medical history, past social history, past surgical history and problem list.    Review of Systems    Objective   Physical Exam     **The patient is wearing a mask**  Constitutional:  Vital signs reviewed.  No apparent distress.  Well groomed.  Eyes:  No injection, no icterus.    Respiratory:  Normal effort.  Clear to auscultation bilaterally.  Cardiovascular:  Regular rate and rhythm.  No murmurs.  No carotid bruits. Symmetric radial pulses.  Musculoskeletal: Muscle tone is decreased in the distal bilateral lower extremities.  Strength is 5/5 in the bilateral upper and lower extremities proximally and distally unless otherwise specified in the neurological exam.  Skin:  No rashes.  Warm, dry, and intact.  Psychiatric:  Good mood.  Normal affect.    Neurologic:  Mental status-  The patient is alert and oriented to person, place and time. Attention/concentration is within normal limits.  Speech is fluent without dysarthria.  The patient is able to name, repeat and follow complex commands without difficulty.  Immediate memory and delayed recall intact (3/3 words immediate and after 4 minutes).  Fund of knowledge normal.  Cranial nerves- Pupils equally round and reactive to light with intact accomodation.  The patient has a known left lower quadrant OPI.  Extraocular movements intact.  Facial sensation intact.   Hearing intact to finger-rub bilaterally.  SCM and trapezius are 5/5 bilaterally.    Motor-  See  musculoskeletal above.  No tremor.  Reflexes- trace to absent in the bilateral biceps, brachioradialis, patellar and achilles.  Toes down-going bilaterally.  Sensation-decreased to vibration in the distal bilateral lower extremities  Coordination- Intact to finger tapping and heel knee shin bilaterally.     The patient's orthostatics are negative today.  The values are scanned in.        Assessment & Plan   Problems Addressed this Visit    None  Visit Diagnoses     Partial symptomatic epilepsy with complex partial seizures, not intractable, without status epilepticus (HCC)    -  Primary    Essential tremor        Relevant Medications    propranolol (INDERAL) 20 MG tablet    Peripheral polyneuropathy          Diagnoses       Codes Comments    Partial symptomatic epilepsy with complex partial seizures, not intractable, without status epilepticus (HCC)    -  Primary ICD-10-CM: G40.209  ICD-9-CM: 345.40     Essential tremor     ICD-10-CM: G25.0  ICD-9-CM: 333.1     Peripheral polyneuropathy     ICD-10-CM: G62.9  ICD-9-CM: 356.9         The patient is an 89-year-old right-handed female with history of a brain aneurysm status post surgery, tremor, osteoporosis, cataracts, skin cancer, epilepsy, gallstones, headaches, hearing loss, neuropathy, sleep apnea, osteoporosis who presents today for follow-up.    1.  Epilepsy-the patient likely has structural epilepsy with focal onset seizures.  Fortunately she has been seizure-free without intolerable side effects to levetiracetam monotherapy.  I recommend continuing on the current dose with no changes.    2.  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  We will continue propranolol at the current dose with no changes.    3.  Severe obstructive sleep apnea on AutoPap-this is now managed by a different sleep physician.    4.  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    We will see the patient back in 1 year or sooner if needed.    A total of 40  minutes of time was spent on this encounter today.  This includes reviewing the patient's records, face-to-face time, and documentation.

## 2023-01-30 ENCOUNTER — OFFICE VISIT (OUTPATIENT)
Dept: FAMILY MEDICINE CLINIC | Facility: CLINIC | Age: 88
End: 2023-01-30
Payer: MEDICARE

## 2023-01-30 VITALS
DIASTOLIC BLOOD PRESSURE: 68 MMHG | HEIGHT: 62 IN | SYSTOLIC BLOOD PRESSURE: 116 MMHG | TEMPERATURE: 98.2 F | WEIGHT: 159.6 LBS | OXYGEN SATURATION: 97 % | BODY MASS INDEX: 29.37 KG/M2 | HEART RATE: 60 BPM

## 2023-01-30 DIAGNOSIS — Z00.00 MEDICARE ANNUAL WELLNESS VISIT, SUBSEQUENT: Primary | ICD-10-CM

## 2023-01-30 DIAGNOSIS — G25.0 ESSENTIAL TREMOR: ICD-10-CM

## 2023-01-30 DIAGNOSIS — Z79.899 ENCOUNTER FOR LONG-TERM (CURRENT) USE OF MEDICATIONS: ICD-10-CM

## 2023-01-30 PROCEDURE — 99213 OFFICE O/P EST LOW 20 MIN: CPT | Performed by: FAMILY MEDICINE

## 2023-01-30 PROCEDURE — 1170F FXNL STATUS ASSESSED: CPT | Performed by: FAMILY MEDICINE

## 2023-01-30 PROCEDURE — G0439 PPPS, SUBSEQ VISIT: HCPCS | Performed by: FAMILY MEDICINE

## 2023-01-30 PROCEDURE — 1159F MED LIST DOCD IN RCRD: CPT | Performed by: FAMILY MEDICINE

## 2023-01-30 PROCEDURE — 1126F AMNT PAIN NOTED NONE PRSNT: CPT | Performed by: FAMILY MEDICINE

## 2023-01-30 NOTE — PROGRESS NOTES
The ABCs of the Annual Wellness Visit  Subsequent Medicare Wellness Visit    Subjective    Mariela Hopkins is a 89 y.o. female who presents for a Subsequent Medicare Wellness Visit.    The following portions of the patient's history were reviewed and   updated as appropriate: allergies, current medications, past family history, past medical history, past social history, past surgical history and problem list.    Compared to one year ago, the patient feels her physical   health is the same.    Compared to one year ago, the patient feels her mental   health is the same.    Recent Hospitalizations:  She was not admitted to the hospital during the last year.       Current Medical Providers:  Patient Care Team:  Marla Mccray DO as PCP - General (Family Medicine)    Outpatient Medications Prior to Visit   Medication Sig Dispense Refill   • cetirizine (zyrTEC) 5 MG tablet Take 1 tablet by mouth Daily. (Patient taking differently: Take 10 mg by mouth Daily.) 30 tablet 5   • fluticasone (FLONASE) 50 MCG/ACT nasal spray USE 2 SPRAYS NASALLY DAILY AS DIRECTED BY PROVIDER 16 g 5   • levETIRAcetam (KEPPRA) 500 MG tablet TAKE 1 TABLET TWICE A  tablet 3   • melatonin 1 MG tablet Take  by mouth. Patient takes 3 mg at night     • multivitamin with minerals tablet tablet Take 1 tablet by mouth Daily.     • Olopatadine HCl (PATADAY OP) Apply  to eye(s) as directed by provider.     • Polyethylene Glycol 3350 (MIRALAX PO) Take  by mouth.     • propranolol (INDERAL) 20 MG tablet Take 1 tablet by mouth 3 (Three) Times a Day. 270 tablet 3   • vitamin D (ERGOCALCIFEROL) 1.25 MG (09105 UT) capsule capsule Take 3,000 Units by mouth Daily.       No facility-administered medications prior to visit.       No opioid medication identified on active medication list. I have reviewed chart for other potential  high risk medication/s and harmful drug interactions in the elderly.          Aspirin is not on active medication list.  Aspirin  "use is not indicated based on review of current medical condition/s. Risk of harm outweighs potential benefits.  .    Patient Active Problem List   Diagnosis   • Aortic valve calcification   • Sleep apnea     Advance Care Planning  Advance Directive is on file.  ACP discussion was held with the patient during this visit. Patient has an advance directive in EMR which is still valid.      Objective    Vitals:    01/30/23 1037   BP: 116/68   Pulse: 60   Temp: 98.2 °F (36.8 °C)   SpO2: 97%   Weight: 72.4 kg (159 lb 9.6 oz)   Height: 157.5 cm (62.01\")   PainSc: 0-No pain     Estimated body mass index is 29.18 kg/m² as calculated from the following:    Height as of this encounter: 157.5 cm (62.01\").    Weight as of this encounter: 72.4 kg (159 lb 9.6 oz).    BMI is >= 25 and <30. (Overweight) The following options were offered after discussion;: exercise counseling/recommendations and nutrition counseling/recommendations      Does the patient have evidence of cognitive impairment? No          HEALTH RISK ASSESSMENT    Smoking Status:  Social History     Tobacco Use   Smoking Status Never   Smokeless Tobacco Never   Tobacco Comments    caffeine use- \"occ\"     Alcohol Consumption:  Social History     Substance and Sexual Activity   Alcohol Use Yes    Comment: beer,wine     Fall Risk Screen:    STEADI Fall Risk Assessment was completed, and patient is at LOW risk for falls.Assessment completed on:1/30/2023    Depression Screening:  PHQ-2/PHQ-9 Depression Screening 1/30/2023   Little Interest or Pleasure in Doing Things 0-->not at all   Feeling Down, Depressed or Hopeless 0-->not at all   PHQ-9: Brief Depression Severity Measure Score 0       Health Habits and Functional and Cognitive Screening:  Functional & Cognitive Status 1/30/2023   Do you have difficulty preparing food and eating? No   Do you have difficulty bathing yourself, getting dressed or grooming yourself? No   Do you have difficulty using the toilet? No   Do you " have difficulty moving around from place to place? No   Do you have trouble with steps or getting out of a bed or a chair? No   Current Diet Well Balanced Diet   Dental Exam Up to date   Eye Exam Up to date   Exercise (times per week) 7 times per week   Current Exercises Include Walking;Stationary Bicycling/Spin Class   Do you need help using the phone?  No   Are you deaf or do you have serious difficulty hearing?  Yes   Do you need help with transportation? Yes   Do you need help shopping? Yes   Do you need help preparing meals?  No   Do you need help with housework?  No   Do you need help with laundry? Yes   Do you need help taking your medications? No   Do you need help managing money? No   Do you ever drive or ride in a car without wearing a seat belt? No   Have you felt unusual stress, anger or loneliness in the last month? No   Who do you live with? Community   If you need help, do you have trouble finding someone available to you? No   Have you been bothered in the last four weeks by sexual problems? No   Do you have difficulty concentrating, remembering or making decisions? No       Age-appropriate Screening Schedule:  Refer to the list below for future screening recommendations based on patient's age, sex and/or medical conditions. Orders for these recommended tests are listed in the plan section. The patient has been provided with a written plan.    Health Maintenance   Topic Date Due   • DXA SCAN  06/09/2024   • TDAP/TD VACCINES (4 - Td or Tdap) 06/01/2029   • INFLUENZA VACCINE  Completed   • ZOSTER VACCINE  Completed                CMS Preventative Services Quick Reference  Risk Factors Identified During Encounter  Fall Risk-High or Moderate: Discussed Fall Prevention in the home  Immunizations Discussed/Encouraged: Influenza, Prevnar 20 (Pneumococcal 20-valent conjugate), Shingrix and COVID19  The above risks/problems have been discussed with the patient.  Pertinent information has been shared with the  "patient in the After Visit Summary.  An After Visit Summary and PPPS were made available to the patient.    Follow Up:   Next Medicare Wellness visit to be scheduled in 1 year.       Additional E&M Note during same encounter follows:  Patient has multiple medical problems which are significant and separately identifiable that require additional work above and beyond the Medicare Wellness Visit.      Chief Complaint  Medicare Wellness-subsequent    Subjective        HPI  Mariela Hopkins is also being seen today for follow-up on her chronic health conditions:    Essential tremor.  She states that she has a history of essential tremor. She takes propranolol 20 mg 2-3 times daily and it really helps throughout the day with her shakiness. She denies any side effects.      Seizure disorder.  Currently she is seeing Neurology who is also monitoring tremor as well as seizures.  She continues to take Keppra as directed.           Objective   Vital Signs:  /68   Pulse 60   Temp 98.2 °F (36.8 °C)   Ht 157.5 cm (62.01\")   Wt 72.4 kg (159 lb 9.6 oz)   SpO2 97%   BMI 29.18 kg/m²     Physical Exam  Vitals and nursing note reviewed.   Constitutional:       Appearance: Normal appearance. She is well-developed.   HENT:      Head: Normocephalic and atraumatic.      Right Ear: External ear normal.      Left Ear: External ear normal.      Nose: Nose normal.   Eyes:      General: No scleral icterus.     Conjunctiva/sclera: Conjunctivae normal.   Cardiovascular:      Rate and Rhythm: Normal rate and regular rhythm.      Heart sounds: Normal heart sounds.   Pulmonary:      Effort: Pulmonary effort is normal.      Breath sounds: Normal breath sounds.   Musculoskeletal:      Cervical back: Normal range of motion and neck supple.      Right lower leg: No edema.      Left lower leg: No edema.   Lymphadenopathy:      Cervical: No cervical adenopathy.   Skin:     General: Skin is warm and dry.      Findings: No rash.   Neurological: "      Mental Status: She is alert and oriented to person, place, and time.   Psychiatric:         Mood and Affect: Mood normal.         Behavior: Behavior normal.         Thought Content: Thought content normal.         Judgment: Judgment normal.          The following data was reviewed by: Marla Mccray DO on 01/30/2023:  Common labs    Common Labs 7/29/22 7/29/22    1023 1023   Glucose 75    BUN 13    Creatinine 0.64    Sodium 142    Potassium 4.9    Chloride 105    Calcium 9.8    Total Protein 7.1    Albumin 4.4    Total Bilirubin 0.4    Alkaline Phosphatase 54    AST (SGOT) 18    ALT (SGPT) 11    WBC  6.8   Hemoglobin  13.9   Hematocrit  41.6   Platelets  299                          Assessment and Plan   Diagnoses and all orders for this visit:    1. Medicare annual wellness visit, subsequent (Primary)    2. Essential tremor    3. Encounter for long-term (current) use of medications  -     CBC & Differential  -     Comprehensive Metabolic Panel  -     Hemoglobin A1c  -     TSH    Routine health maintenance.  Patient is up-to-date with all routine health screenings.    Patient is also here to follow-up on chronic stable essential tremor.  Surveillance labs were obtained today and any medication changes will be made based on lab results and will be called to the patient later this week.         Follow Up   Return in about 7 months (around 8/16/2023) for Tremor.  Patient was given instructions and counseling regarding her condition or for health maintenance advice. Please see specific information pulled into the AVS if appropriate.

## 2023-01-31 LAB
ALBUMIN SERPL-MCNC: 4.4 G/DL (ref 3.6–4.6)
ALBUMIN/GLOB SERPL: 1.8 {RATIO} (ref 1.2–2.2)
ALP SERPL-CCNC: 54 IU/L (ref 44–121)
ALT SERPL-CCNC: 11 IU/L (ref 0–32)
AST SERPL-CCNC: 17 IU/L (ref 0–40)
BASOPHILS # BLD AUTO: 0 X10E3/UL (ref 0–0.2)
BASOPHILS NFR BLD AUTO: 1 %
BILIRUB SERPL-MCNC: 0.2 MG/DL (ref 0–1.2)
BUN SERPL-MCNC: 17 MG/DL (ref 8–27)
BUN/CREAT SERPL: 27 (ref 12–28)
CALCIUM SERPL-MCNC: 9.7 MG/DL (ref 8.7–10.3)
CHLORIDE SERPL-SCNC: 106 MMOL/L (ref 96–106)
CO2 SERPL-SCNC: 25 MMOL/L (ref 20–29)
CREAT SERPL-MCNC: 0.62 MG/DL (ref 0.57–1)
EGFRCR SERPLBLD CKD-EPI 2021: 85 ML/MIN/1.73
EOSINOPHIL # BLD AUTO: 0.1 X10E3/UL (ref 0–0.4)
EOSINOPHIL NFR BLD AUTO: 2 %
ERYTHROCYTE [DISTWIDTH] IN BLOOD BY AUTOMATED COUNT: 12.6 % (ref 11.7–15.4)
GLOBULIN SER CALC-MCNC: 2.4 G/DL (ref 1.5–4.5)
GLUCOSE SERPL-MCNC: 84 MG/DL (ref 70–99)
HBA1C MFR BLD: 6.2 % (ref 4.8–5.6)
HCT VFR BLD AUTO: 40.1 % (ref 34–46.6)
HGB BLD-MCNC: 13.6 G/DL (ref 11.1–15.9)
IMM GRANULOCYTES # BLD AUTO: 0 X10E3/UL (ref 0–0.1)
IMM GRANULOCYTES NFR BLD AUTO: 0 %
LYMPHOCYTES # BLD AUTO: 3 X10E3/UL (ref 0.7–3.1)
LYMPHOCYTES NFR BLD AUTO: 40 %
MCH RBC QN AUTO: 31.6 PG (ref 26.6–33)
MCHC RBC AUTO-ENTMCNC: 33.9 G/DL (ref 31.5–35.7)
MCV RBC AUTO: 93 FL (ref 79–97)
MONOCYTES # BLD AUTO: 1.1 X10E3/UL (ref 0.1–0.9)
MONOCYTES NFR BLD AUTO: 14 %
NEUTROPHILS # BLD AUTO: 3.3 X10E3/UL (ref 1.4–7)
NEUTROPHILS NFR BLD AUTO: 43 %
PLATELET # BLD AUTO: 308 X10E3/UL (ref 150–450)
POTASSIUM SERPL-SCNC: 4.5 MMOL/L (ref 3.5–5.2)
PROT SERPL-MCNC: 6.8 G/DL (ref 6–8.5)
RBC # BLD AUTO: 4.31 X10E6/UL (ref 3.77–5.28)
SODIUM SERPL-SCNC: 144 MMOL/L (ref 134–144)
TSH SERPL DL<=0.005 MIU/L-ACNC: 1.37 UIU/ML (ref 0.45–4.5)
WBC # BLD AUTO: 7.6 X10E3/UL (ref 3.4–10.8)

## 2023-06-14 ENCOUNTER — TELEPHONE (OUTPATIENT)
Dept: FAMILY MEDICINE CLINIC | Facility: CLINIC | Age: 88
End: 2023-06-14

## 2023-06-14 NOTE — TELEPHONE ENCOUNTER
Caller: Mariela Hopkins    Relationship: Self    Best call back number: [phone]     What medication are you requesting: ANTI-ANXIETY MEDICATION    What are your current symptoms: ANXIETY    How long have you been experiencing symptoms: 1 MONTH    Have you had these symptoms before:    [] Yes  [x] No    Have you been treated for these symptoms before:   [] Yes  [x] No    If a prescription is needed, what is your preferred pharmacy and phone number: [facility]ACY #[id number] - [place], [place] - [address] [phone] [facility] [phone]      Additional notes: PATIENT'S TWIN SISTER, WHO SHE HAS LIVED WITH FOR MOST OF HER LIFE, IS CURRENTLY IN [place] FOR TREATMENT DUE TO BEING VERY ILL - THE PATIENT IS CURRENTLY LIVING WITH HER DAUGHTER, BUT HAS DEVELOPED ANXIETY SINCE BEING  FROM HER SISTER.

## 2023-06-16 ENCOUNTER — OFFICE VISIT (OUTPATIENT)
Dept: FAMILY MEDICINE CLINIC | Facility: CLINIC | Age: 88
End: 2023-06-16
Payer: MEDICARE

## 2023-06-16 VITALS
TEMPERATURE: 97.5 F | HEART RATE: 76 BPM | DIASTOLIC BLOOD PRESSURE: 78 MMHG | OXYGEN SATURATION: 96 % | WEIGHT: 156 LBS | SYSTOLIC BLOOD PRESSURE: 126 MMHG | HEIGHT: 62 IN | BODY MASS INDEX: 28.71 KG/M2

## 2023-06-16 DIAGNOSIS — F43.22 ADJUSTMENT DISORDER WITH ANXIETY: Primary | ICD-10-CM

## 2023-06-16 PROCEDURE — 1160F RVW MEDS BY RX/DR IN RCRD: CPT | Performed by: FAMILY MEDICINE

## 2023-06-16 PROCEDURE — 1159F MED LIST DOCD IN RCRD: CPT | Performed by: FAMILY MEDICINE

## 2023-06-16 PROCEDURE — 99213 OFFICE O/P EST LOW 20 MIN: CPT | Performed by: FAMILY MEDICINE

## 2023-06-16 RX ORDER — SERTRALINE HYDROCHLORIDE 25 MG/1
25 TABLET, FILM COATED ORAL DAILY
Qty: 30 TABLET | Refills: 1 | Status: SHIPPED | OUTPATIENT
Start: 2023-06-16

## 2023-06-16 RX ORDER — SACCHAROMYCES BOULARDII 250 MG
250 CAPSULE ORAL 2 TIMES DAILY
COMMUNITY

## 2023-06-16 NOTE — PROGRESS NOTES
"Chief Complaint  Anxiety (Separation anxiety due to sister moving )    Subjective        Mariela Hopkins presents to [facility]  History of Present Illness  Patient is here today with her daughter who provides some of the history.  She is here with a new problem.  She has been having uncontrollable anxiety.  She states that she feels anxious all the time.  She has also been nauseated intermittently.  She denies any diarrhea or bowel changes.  The patient has not had any vomiting.  She has a hard time \"turning off my brain\".  She states \"I just cannot relax and enjoy anything\".  Her daughter explains that she has friends coming to visit her in September no she can do is worry about their visit when she would normally be happy to have them.  The patient has taken a prescription medication that she does not know the name of in the past for anxiety.  However, it made her too groggy and she did not continue it.  Patient denies heart palpitations, chest pain, or shortness of breath.  Patient denies SI or HI and states that she has good support from her daughter.      Objective   Vital Signs:  /78   Pulse 76   Temp 97.5 °F (36.4 °C)   Ht 157.5 cm (62\")   Wt 70.8 kg (156 lb)   SpO2 96%   BMI 28.53 kg/m²   Estimated body mass index is 28.53 kg/m² as calculated from the following:    Height as of this encounter: 157.5 cm (62\").    Weight as of this encounter: 70.8 kg (156 lb).               Physical Exam  Vitals and nursing note reviewed.   Constitutional:       Appearance: She is well-developed.   HENT:      Head: Normocephalic and atraumatic.      Right Ear: External ear normal.      Left Ear: External ear normal.      Nose: Nose normal.   Eyes:      General: No scleral icterus.     Conjunctiva/sclera: Conjunctivae normal.   Cardiovascular:      Rate and Rhythm: Normal rate and regular rhythm.      Heart sounds: Normal heart sounds.   Pulmonary:      Effort: Pulmonary effort is " normal.      Breath sounds: Normal breath sounds.   Musculoskeletal:      Cervical back: Normal range of motion and neck supple.   Lymphadenopathy:      Cervical: No cervical adenopathy.   Skin:     General: Skin is warm and dry.      Findings: No rash.   Neurological:      Mental Status: She is alert and oriented to person, place, and time.   Psychiatric:         Behavior: Behavior normal.         Thought Content: Thought content normal.         Judgment: Judgment normal.      Result Review :  The following data was reviewed by: Marla Mccray DO on 06/16/2023:  Common labs          7/29/2022    10:23 1/30/2023    11:29   Common Labs   Glucose 75  84    BUN 13  17    Creatinine 0.64  0.62    Sodium 142  144    Potassium 4.9  4.5    Chloride 105  106    Calcium 9.8  9.7    Total Protein 7.1  6.8    Albumin 4.4  4.4    Total Bilirubin 0.4  0.2    Alkaline Phosphatase 54  54    AST (SGOT) 18  17    ALT (SGPT) 11  11    WBC 6.8  7.6    Hemoglobin 13.9  13.6    Hematocrit 41.6  40.1    Platelets 299  308    Hemoglobin A1C  6.2      TSH          1/30/2023    11:29   TSH   TSH 1.370                   Assessment and Plan   Diagnoses and all orders for this visit:    1. Adjustment disorder with anxiety (Primary)  -     sertraline (Zoloft) 25 MG tablet; Take 1 tablet by mouth Daily.  Dispense: 30 tablet; Refill: 1      Patient is here today for new problem of anxiety.  Patient was advised to start Zoloft after dinner.  Risks and side effects were discussed with the patient.  If the patient has any issues or concerns she was advised to follow-up sooner but otherwise I will see her back in 1 month       Follow Up   Return in about 1 month (around 7/19/2023) for new onset Anxiety, 6 mo f/u on tremor.  Patient was given instructions and counseling regarding her condition or for health maintenance advice. Please see specific information pulled into the AVS if appropriate.

## 2023-08-02 RX ORDER — FLUTICASONE PROPIONATE 50 MCG
SPRAY, SUSPENSION (ML) NASAL
Qty: 16 G | Refills: 5 | Status: SHIPPED | OUTPATIENT
Start: 2023-08-02

## 2023-10-18 ENCOUNTER — TELEPHONE (OUTPATIENT)
Dept: NEUROLOGY | Facility: CLINIC | Age: 88
End: 2023-10-18
Payer: MEDICARE

## 2023-10-18 NOTE — TELEPHONE ENCOUNTER
Left vm regarding reschedule from Dr. Elder to different provider. MyChart (if applicable) & mail reminder sent.

## 2023-12-21 RX ORDER — FLUTICASONE PROPIONATE 50 MCG
SPRAY, SUSPENSION (ML) NASAL
Qty: 16 G | Refills: 5 | Status: SHIPPED | OUTPATIENT
Start: 2023-12-21

## 2023-12-21 RX ORDER — LEVETIRACETAM 500 MG/1
TABLET ORAL
Qty: 180 TABLET | Refills: 1 | Status: SHIPPED | OUTPATIENT
Start: 2023-12-21

## 2024-02-09 ENCOUNTER — OFFICE VISIT (OUTPATIENT)
Dept: NEUROLOGY | Facility: CLINIC | Age: 89
End: 2024-02-09
Payer: MEDICARE

## 2024-02-09 VITALS
BODY MASS INDEX: 29.26 KG/M2 | HEART RATE: 68 BPM | OXYGEN SATURATION: 93 % | HEIGHT: 62 IN | DIASTOLIC BLOOD PRESSURE: 66 MMHG | SYSTOLIC BLOOD PRESSURE: 120 MMHG | WEIGHT: 159 LBS

## 2024-02-09 DIAGNOSIS — G40.109 FOCAL EPILEPSY: ICD-10-CM

## 2024-02-09 DIAGNOSIS — G25.0 ESSENTIAL TREMOR: ICD-10-CM

## 2024-02-09 DIAGNOSIS — R25.1 TREMOR: Primary | ICD-10-CM

## 2024-02-09 DIAGNOSIS — R42 SPINNING SENSATION: ICD-10-CM

## 2024-02-09 RX ORDER — LEVETIRACETAM 500 MG/1
500 TABLET ORAL 2 TIMES DAILY
Qty: 180 TABLET | Refills: 3 | Status: SHIPPED | OUTPATIENT
Start: 2024-02-09

## 2024-02-09 RX ORDER — PROPRANOLOL HYDROCHLORIDE 20 MG/1
20 TABLET ORAL 3 TIMES DAILY
Qty: 270 TABLET | Refills: 3 | Status: SHIPPED | OUTPATIENT
Start: 2024-02-09

## 2024-02-09 NOTE — LETTER
February 9, 2024       No Recipients    Patient: Mariela Hopkins   YOB: 1933   Date of Visit: 2/9/2024     Dear Marla Mccray, DO:       Thank you for referring Mariela Hopkins to me for evaluation. Below are the relevant portions of my assessment and plan of care.    If you have questions, please do not hesitate to call me. I look forward to following Mariela along with you.         Sincerely,        Alberto Lion MD        CC:   No Recipients    Alberto Lion MD  02/09/24 1436  Sign when Signing Visit  Chief Complaint  Tremors and Seizures and spinning sensation    Subjective         Mariela Hopkins presents to [facility] for   HISTORY OF PRESENT ILLNESS:    Mariela Hopkins is a 89 year old right handed woman who presents to neurology clinic with her daughter, Ana, for initial evaluation by myself and follow up of tremors and seizures and spinning sensation.  Of note patient was previously followed by Dr. Elder who has transferred patient's care to myself as she is no longer with our clinic.  She also has GIOVANA for which she is followed by sleep specialist at this time.  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  She was on Dilantin and phenobarbital for about 50 years.  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  She has had 6 seizures total and last one was reportedly over 25 years ago.  She reports having some spinning sensation in her head.  She tells me years ago she had vertigo and had Epley maneuver which resolved her symptom at that time.  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  She is currently on propranolol for the tremors which is helping.  She is not driving, taking showers as opposed to baths and staying off of elevated places and heights.  She is currently  "living in assisted living.  She would like to be evaluated for this sensation of dizziness and spinning.  She has not had any recent falls.      Past Medical History:   Diagnosis Date   • Aneurysm    • Aortic valve calcification 6/14/2021   • Cataracts, bilateral    • Constipation    • Difficulty walking    • Gall stones    • Headache, tension-type    • HL (hearing loss)    • Pain in ankle     Right ankle   • Peripheral neuropathy    • Pre-diabetes    • PRP (pityriasis rubra pilaris)    • Ringing in ear    • Seizures    • Sleep apnea    • Wears hearing aid         Family History   Problem Relation Age of Onset   • Osteoporosis Mother    • Cancer Father    • Neuropathy Sister         aortic valve   • Osteoporosis Sister         Social History     Socioeconomic History   • Marital status: Single   • Number of children: 3   Tobacco Use   • Smoking status: Never   • Smokeless tobacco: Never   • Tobacco comments:     caffeine use- \"occ\"   Vaping Use   • Vaping Use: Never used   Substance and Sexual Activity   • Alcohol use: Yes     Comment: beer,wine   • Drug use: Yes     Types: Marijuana     Comment: \"hemp muffins\"   • Sexual activity: Not Currently        I have reviewed and confirmed the accuracy of the ROS as documented by the MA/LPN/RN Alberto Lion MD   Review of Systems   Musculoskeletal:  Negative for gait problem.   Neurological:  Positive for tremors. Negative for dizziness, seizures, syncope, facial asymmetry, speech difficulty, weakness, light-headedness, numbness, headache, memory problem and confusion.   Hematological:  Negative for adenopathy. Bruises/bleeds easily.        Objective  Vital Signs:   /66   Pulse 68   Ht 157.5 cm (62\")   Wt 72.1 kg (159 lb)   SpO2 93%   BMI 29.08 kg/m²       PHYSICAL EXAM:    General   Mental Status - Alert. General Appearance - Well developed, Well groomed, Oriented and Cooperative. Orientation - Oriented X3.       Head and Neck  Head - normocephalic, atraumatic " with no lesions or palpable masses.  Neck    Global Assessment - supple.       Eye   Sclera/Conjunctiva - Bilateral - Normal.    ENMT  Mouth and Throat   Oral Cavity/Oropharynx: Oropharynx - the soft palate,uvula and tongue are normal in appearance.    Chest and Lung Exam   Chest - lung clear to auscultation bilaterally.    Cardiovascular   Cardiovascular examination reveals  - normal heart sounds, regular rate and rhythm.    Neurologic   Mental Status: Speech - Normal. Cognitive function - appropriate fund of knowledge. No impairment of attention, Impairment of concentration, impairment of long term memory or impairment of short term memory.  Cranial Nerves:   II Optic: Visual acuity - Left - Normal. Right - Normal. Visual fields - Normal (to confrontation).  III Oculomotor: Pupillary constriction - Left - Normal. Right - Normal.  VII Facial: - Normal Bilaterally.   IX Glossopharyngeal / X Vagus - Normal.  XI Accessory: Trapezius - Bilateral - Normal. Sternocleidomastoid - Bilateral - Normal.  XII Hypoglossal - Bilateral - Normal.  Eye Movements: - Normal Bilaterally.  Sensory:   Light Touch: Intact - Globally.  Motor:   Bulk and Contour: - Normal.  Tone: - Normal.  Tremor: Fast frequency tremor bilaterally.   Strength: 5/5 normal muscle strength - All Muscles.   General Assessment of Reflexes: - deep tendon reflexes are normal. Coordination - No Impairment of finger-to-nose or Impairment of rapid alternating movements. Gait - Broad based, uses walker to ambulate.       Result Review:                 Assessment and Plan    Problem List Items Addressed This Visit          ENT    Spinning sensation    Current Assessment & Plan     Spoke with them with regards to potential etiology of her symptoms including vertigo vs medication effect vs allergy related.  I will refer her to vestibular therapy for further evaluation and they can also be evaluated by ENT in the future if needed.           Relevant Orders    Ambulatory  Referral to Physical Therapy Vestibular       Neuro    Focal epilepsy    Current Assessment & Plan     89 year old right handed woman with tremors and seizures and spinning sensation.  Of note patient was previously followed by Dr. Elder who has transferred patient's care to myself as she is no longer with our clinic.  She also has GIOVANA for which she is followed by sleep specialist at this time.  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  She was on Dilantin and phenobarbital for about 50 years.  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  She has had 6 seizures total and last one was reportedly over 25 years ago.  She reports having some spinning sensation in her head.  She tells me years ago she had vertigo and had Epley maneuver which resolved her symptom at that time.  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  She is currently on propranolol for the tremors which is helping.  She is not driving, taking showers as opposed to baths and staying off of elevated places and heights.  She is currently living in assisted living.  She would like to be evaluated for this sensation of dizziness and spinning.  I will continue the levetiracetam which has maintained her seizure free. Discussed seizure precautions.  Informed her that seizures are potentially life threatening.  She reports having bone density testing done in the past and has been treated for bone density in the past and they tell me her bone density came back good.           Relevant Medications    levETIRAcetam (KEPPRA) 500 MG tablet    Tremor - Primary    Current Assessment & Plan     Will continue the propranolol which is helping.          Relevant Medications    levETIRAcetam (KEPPRA) 500 MG tablet     Other Visit Diagnoses       Essential tremor        Relevant  Medications    levETIRAcetam (KEPPRA) 500 MG tablet    propranolol (INDERAL) 20 MG tablet            I spent 40 minutes caring for Mariela on this date of service. This time includes time spent by me in the following activities:preparing for the visit, reviewing tests, obtaining and/or reviewing a separately obtained history, performing a medically appropriate examination and/or evaluation , counseling and educating the patient/family/caregiver, ordering medications, tests, or procedures, documenting information in the medical record, and care coordination    Follow Up   Return in about 1 year (around 2/9/2025).  Patient was given instructions and counseling regarding her condition or for health maintenance advice. Please see specific information pulled into the AVS if appropriate.

## 2024-02-09 NOTE — PROGRESS NOTES
Chief Complaint  Tremors and Seizures and spinning sensation    Subjective          Mariela Hopkins presents to [facility] NEUROLOGY for   HISTORY OF PRESENT ILLNESS:    Mariela Hopknis is a 89 year old right handed woman who presents to neurology clinic with her daughter, Ana, for initial evaluation by myself and follow up of tremors and seizures and spinning sensation.  Of note patient was previously followed by Dr. Elder who has transferred patient's care to myself as she is no longer with our clinic.  She also has GIOVANA for which she is followed by sleep specialist at this time.  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  She was on Dilantin and phenobarbital for about 50 years.  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  She has had 6 seizures total and last one was reportedly over 25 years ago.  She reports having some spinning sensation in her head.  She tells me years ago she had vertigo and had Epley maneuver which resolved her symptom at that time.  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  She is currently on propranolol for the tremors which is helping.  She is not driving, taking showers as opposed to baths and staying off of elevated places and heights.  She is currently living in assisted living.  She would like to be evaluated for this sensation of dizziness and spinning.  She has not had any recent falls.      Past Medical History:   Diagnosis Date    Aneurysm     Aortic valve calcification 6/14/2021    Cataracts, bilateral     Constipation     Difficulty walking     Gall stones     Headache, tension-type     HL (hearing loss)     Pain in ankle     Right ankle    Peripheral neuropathy     Pre-diabetes     PRP (pityriasis rubra pilaris)     Ringing in ear     Seizures     Sleep apnea      "Wears hearing aid         Family History   Problem Relation Age of Onset    Osteoporosis Mother     Cancer Father     Neuropathy Sister         aortic valve    Osteoporosis Sister         Social History     Socioeconomic History    Marital status: Single    Number of children: 3   Tobacco Use    Smoking status: Never    Smokeless tobacco: Never    Tobacco comments:     caffeine use- \"occ\"   Vaping Use    Vaping Use: Never used   Substance and Sexual Activity    Alcohol use: Yes     Comment: beer,wine    Drug use: Yes     Types: Marijuana     Comment: \"hemp muffins\"    Sexual activity: Not Currently        I have reviewed and confirmed the accuracy of the ROS as documented by the MA/LPN/RN Alberto Lion MD   Review of Systems   Musculoskeletal:  Negative for gait problem.   Neurological:  Positive for tremors. Negative for dizziness, seizures, syncope, facial asymmetry, speech difficulty, weakness, light-headedness, numbness, headache, memory problem and confusion.   Hematological:  Negative for adenopathy. Bruises/bleeds easily.        Objective   Vital Signs:   /66   Pulse 68   Ht 157.5 cm (62\")   Wt 72.1 kg (159 lb)   SpO2 93%   BMI 29.08 kg/m²       PHYSICAL EXAM:    General   Mental Status - Alert. General Appearance - Well developed, Well groomed, Oriented and Cooperative. Orientation - Oriented X3.       Head and Neck  Head - normocephalic, atraumatic with no lesions or palpable masses.  Neck    Global Assessment - supple.       Eye   Sclera/Conjunctiva - Bilateral - Normal.    ENMT  Mouth and Throat   Oral Cavity/Oropharynx: Oropharynx - the soft palate,uvula and tongue are normal in appearance.    Chest and Lung Exam   Chest - lung clear to auscultation bilaterally.    Cardiovascular   Cardiovascular examination reveals  - normal heart sounds, regular rate and rhythm.    Neurologic   Mental Status: Speech - Normal. Cognitive function - appropriate fund of knowledge. No impairment of attention, " Impairment of concentration, impairment of long term memory or impairment of short term memory.  Cranial Nerves:   II Optic: Visual acuity - Left - Normal. Right - Normal. Visual fields - Normal (to confrontation).  III Oculomotor: Pupillary constriction - Left - Normal. Right - Normal.  VII Facial: - Normal Bilaterally.   IX Glossopharyngeal / X Vagus - Normal.  XI Accessory: Trapezius - Bilateral - Normal. Sternocleidomastoid - Bilateral - Normal.  XII Hypoglossal - Bilateral - Normal.  Eye Movements: - Normal Bilaterally.  Sensory:   Light Touch: Intact - Globally.  Motor:   Bulk and Contour: - Normal.  Tone: - Normal.  Tremor: Fast frequency tremor bilaterally.   Strength: 5/5 normal muscle strength - All Muscles.   General Assessment of Reflexes: - deep tendon reflexes are normal. Coordination - No Impairment of finger-to-nose or Impairment of rapid alternating movements. Gait - Broad based, uses walker to ambulate.       Result Review :                 Assessment and Plan    Problem List Items Addressed This Visit          ENT    Spinning sensation    Current Assessment & Plan     Spoke with them with regards to potential etiology of her symptoms including vertigo vs medication effect vs allergy related.  I will refer her to vestibular therapy for further evaluation and they can also be evaluated by ENT in the future if needed.           Relevant Orders    Ambulatory Referral to Physical Therapy Vestibular       Neuro    Focal epilepsy    Current Assessment & Plan     89 year old right handed woman with tremors and seizures and spinning sensation.  Of note patient was previously followed by Dr. Elder who has transferred patient's care to myself as she is no longer with our clinic.  She also has GIOVANA for which she is followed by sleep specialist at this time.  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  She was on Dilantin and phenobarbital for about 50 years.  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  She has had 6 seizures total and last one was reportedly over 25 years ago.  She reports having some spinning sensation in her head.  She tells me years ago she had vertigo and had Epley maneuver which resolved her symptom at that time.  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  She is currently on propranolol for the tremors which is helping.  She is not driving, taking showers as opposed to baths and staying off of elevated places and heights.  She is currently living in assisted living.  She would like to be evaluated for this sensation of dizziness and spinning.  I will continue the levetiracetam which has maintained her seizure free. Discussed seizure precautions.  Informed her that seizures are potentially life threatening.  She reports having bone density testing done in the past and has been treated for bone density in the past and they tell me her bone density came back good.           Relevant Medications    levETIRAcetam (KEPPRA) 500 MG tablet    Tremor - Primary    Current Assessment & Plan     Will continue the propranolol which is helping.          Relevant Medications    levETIRAcetam (KEPPRA) 500 MG tablet     Other Visit Diagnoses       Essential tremor        Relevant Medications    levETIRAcetam (KEPPRA) 500 MG tablet    propranolol (INDERAL) 20 MG tablet            I spent 40 minutes caring for Mariela on this date of service. This time includes time spent by me in the following activities:preparing for the visit, reviewing tests, obtaining and/or reviewing a separately obtained history, performing a medically appropriate examination and/or evaluation , counseling and educating the patient/family/caregiver, ordering medications, tests, or procedures, documenting information in the medical record, and care  coordination    Follow Up   Return in about 1 year (around 2/9/2025).  Patient was given instructions and counseling regarding her condition or for health maintenance advice. Please see specific information pulled into the AVS if appropriate.

## 2024-02-09 NOTE — ASSESSMENT & PLAN NOTE
Spoke with them with regards to potential etiology of her symptoms including vertigo vs medication effect vs allergy related.  I will refer her to vestibular therapy for further evaluation and they can also be evaluated by ENT in the future if needed.

## 2024-02-09 NOTE — PATIENT INSTRUCTIONS
[facility]  Alberto Lion MD  Neurology clinic  [phone]    With anti-seizure medications, you may initially notice side effects of fatigue, drowsiness, unsteadiness, and dizziness.  Other possible side effects include nausea, abdominal pain, headache, blurry or double vision, slurred speech and mood changes.  Generally, patients will noticed these symptoms when the medication is first started or with higher doses and will go away with time.    It is import to consistently take your medication every day.  Missing just one dose may put you at risk for a breakthrough seizure.  Consider using reminders on your phone or a pill box.    If you develop a rash, please call the neurology clinic immediately or notify another healthcare professional, as this may be potentially life-threatening.  If you are unable to reach a healthcare professional, go to the emergency room immediately for further evaluation.    If you develop thoughts of wanting to hurt yourself or others, please call the neurology clinic immediately to notify another healthcare professional.  If you are unable to reach a healthcare professional, go to the emergency room immediately for further evaluation.    It is the [place] state law that you cannot drive within 90 days of a seizure.    You should avoid certain activities that if you were to have a seizure, you could harm yourself or others. In general, it is recommended that you avoid operating heavy machinery or power tools, swimming or taking baths by yourself (showers are ok), don't stand over open flames, don't get on high ladders or the roof.  I also recommend to avoid sleeping on your stomach.    For further information on epilepsy and resources available to patients and their families, please visit the [facility] at [email or web address] or call [phone].    **Check out the [facility]'s monthly Art Group Gathering.  They are located  at [facility], [address] [place] [address].  Call Lynne Mueller at [phone] or email her at [email or web address] for the dates of future gatherings.**      **If you have having memory problems, consider HOBSCOTCH (Home-Based Self-management and Cognitive Training Changes lives).  It is an 8 week self-management program for adults with epilepsy and memory problems.  The program is free at the [facility].  Contact Dot Daugherty at [phone] or [email or web address].**         In general, we recommend using good judgement when you are doing certain activities and to avoid those activities that if you were to have a seizure, you could harm yourself or others. In the [facility], it is the law that you cannot drive within 90 days of a seizure. We also recommend not standing over open flames, not getting on high ladders or the roof, not swimming or taking baths by yourself (showers are ok) and not operating heavy machinery or power tools.

## 2024-02-09 NOTE — ASSESSMENT & PLAN NOTE
89 year old right handed woman with tremors and seizures and spinning sensation.  Of note patient was previously followed by Dr. Elder who has transferred patient's care to myself as she is no longer with our clinic.  She also has GIOVANA for which she is followed by sleep specialist at this time.  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  She was on Dilantin and phenobarbital for about 50 years.  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  She has had 6 seizures total and last one was reportedly over 25 years ago.  She reports having some spinning sensation in her head.  She tells me years ago she had vertigo and had Epley maneuver which resolved her symptom at that time.  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  She is currently on propranolol for the tremors which is helping.  She is not driving, taking showers as opposed to baths and staying off of elevated places and heights.  She is currently living in assisted living.  She would like to be evaluated for this sensation of dizziness and spinning.  I will continue the levetiracetam which has maintained her seizure free. Discussed seizure precautions.  Informed her that seizures are potentially life threatening.  She reports having bone density testing done in the past and has been treated for bone density in the past and they tell me her bone density came back good.

## 2024-02-20 ENCOUNTER — OFFICE VISIT (OUTPATIENT)
Dept: FAMILY MEDICINE CLINIC | Facility: CLINIC | Age: 89
End: 2024-02-20
Payer: MEDICARE

## 2024-02-20 VITALS
HEART RATE: 61 BPM | WEIGHT: 159.8 LBS | HEIGHT: 62 IN | SYSTOLIC BLOOD PRESSURE: 103 MMHG | DIASTOLIC BLOOD PRESSURE: 68 MMHG | OXYGEN SATURATION: 97 % | BODY MASS INDEX: 29.4 KG/M2

## 2024-02-20 DIAGNOSIS — Z79.899 ENCOUNTER FOR LONG-TERM (CURRENT) USE OF MEDICATIONS: ICD-10-CM

## 2024-02-20 DIAGNOSIS — R73.09 ELEVATED GLUCOSE: ICD-10-CM

## 2024-02-20 DIAGNOSIS — G25.0 ESSENTIAL TREMOR: ICD-10-CM

## 2024-02-20 DIAGNOSIS — Z00.00 MEDICARE ANNUAL WELLNESS VISIT, SUBSEQUENT: Primary | ICD-10-CM

## 2024-02-20 RX ORDER — MULTIVIT WITH MINERALS/LUTEIN
250 TABLET ORAL DAILY
COMMUNITY

## 2024-02-20 NOTE — PROGRESS NOTES
The ABCs of the Annual Wellness Visit  Subsequent Medicare Wellness Visit    Subjective    Mariela Hopkins is a 89 y.o. female who presents for a Subsequent Medicare Wellness Visit.    The following portions of the patient's history were reviewed and   updated as appropriate: allergies, current medications, past family history, past medical history, past social history, past surgical history, and problem list.    Compared to one year ago, the patient feels her physical   health is the same.    Compared to one year ago, the patient feels her mental   health is the same.    Recent Hospitalizations:  She was not admitted to the hospital during the last year.       Current Medical Providers:  Patient Care Team:  Marla Mccray DO as PCP - General (Family Medicine)    Outpatient Medications Prior to Visit   Medication Sig Dispense Refill    cetirizine (zyrTEC) 5 MG tablet Take 1 tablet by mouth Daily. (Patient taking differently: Take 2 tablets by mouth Daily.) 30 tablet 5    fluticasone (FLONASE) 50 MCG/ACT nasal spray USE 2 SPRAYS NASALLY DAILY AS DIRECTED BY PROVIDER 16 g 5    levETIRAcetam (KEPPRA) 500 MG tablet Take 1 tablet by mouth 2 (Two) Times a Day. 180 tablet 3    Magnesium Hydroxide (MAGNESIA PO) Take  by mouth.      multivitamin with minerals tablet tablet Take 1 tablet by mouth Daily.      Olopatadine HCl (PATADAY OP) Apply  to eye(s) as directed by provider.      Polyethylene Glycol 3350 (MIRALAX PO) Take  by mouth.      propranolol (INDERAL) 20 MG tablet Take 1 tablet by mouth 3 (Three) Times a Day. 270 tablet 3    saccharomyces boulardii (FLORASTOR) 250 MG capsule Take 1 capsule by mouth 2 (Two) Times a Day.      vitamin C (ASCORBIC ACID) 250 MG tablet Take 1 tablet by mouth Daily.      vitamin D (ERGOCALCIFEROL) 1.25 MG (93861 UT) capsule capsule Take 3,000 Units by mouth Daily.      melatonin 1 MG tablet Take  by mouth. Patient takes 3 mg at night (Patient not taking: Reported on 2/20/2024)       No  "facility-administered medications prior to visit.       No opioid medication identified on active medication list. I have reviewed chart for other potential  high risk medication/s and harmful drug interactions in the elderly.        Aspirin is not on active medication list.  Aspirin use is not indicated based on review of current medical condition/s. Risk of harm outweighs potential benefits.  .    Patient Active Problem List   Diagnosis    Aortic valve calcification    Sleep apnea    Focal epilepsy    Tremor    Spinning sensation     Advance Care Planning   Advance Care Planning     Advance Directive is on file.  ACP discussion was held with the patient during this visit. Patient has an advance directive in EMR which is still valid.      Objective    Vitals:    24 1407   BP: 103/68   Pulse: 61   SpO2: 97%   Weight: 72.5 kg (159 lb 12.8 oz)   Height: 157.5 cm (62\")     Estimated body mass index is 29.23 kg/m² as calculated from the following:    Height as of this encounter: 157.5 cm (62\").    Weight as of this encounter: 72.5 kg (159 lb 12.8 oz).           Does the patient have evidence of cognitive impairment? No          HEALTH RISK ASSESSMENT    Smoking Status:  Social History     Tobacco Use   Smoking Status Never   Smokeless Tobacco Never   Tobacco Comments    caffeine use- \"occ\"     Alcohol Consumption:  Social History     Substance and Sexual Activity   Alcohol Use Yes    Comment: beer,wine     Fall Risk Screen:    DERRICK Fall Risk Assessment was completed, and patient is at MODERATE risk for falls. Assessment completed on:2024    Depression Screenin/20/2024     2:03 PM   PHQ-2/PHQ-9 Depression Screening   Little Interest or Pleasure in Doing Things 0-->not at all   Feeling Down, Depressed or Hopeless 0-->not at all   PHQ-9: Brief Depression Severity Measure Score 0       Health Habits and Functional and Cognitive Screenin/20/2024     2:04 PM   Functional & Cognitive Status   Do " you have difficulty preparing food and eating? No   Do you have difficulty bathing yourself, getting dressed or grooming yourself? No   Do you have difficulty using the toilet? No   Do you have difficulty moving around from place to place? No   Do you have trouble with steps or getting out of a bed or a chair? Yes   Current Diet Well Balanced Diet   Dental Exam Up to date   Eye Exam Up to date   Exercise (times per week) 7 times per week   Current Exercises Include Stationary Bicycling/Spin Class   Do you need help using the phone?  No   Are you deaf or do you have serious difficulty hearing?  Yes   Do you need help to go to places out of walking distance? Yes   Do you need help shopping? No   Do you need help preparing meals?  No   Do you need help with housework?  No   Do you need help with laundry? No   Do you need help taking your medications? No   Do you need help managing money? No   Do you ever drive or ride in a car without wearing a seat belt? No       Age-appropriate Screening Schedule:  Refer to the list below for future screening recommendations based on patient's age, sex and/or medical conditions. Orders for these recommended tests are listed in the plan section. The patient has been provided with a written plan.    Health Maintenance   Topic Date Due    COVID-19 Vaccine (5 - 2023-24 season) 09/01/2023    RSV Vaccine - Adults (1 - 1-dose 60+ series) 08/01/2024 (Originally 12/3/1993)    DXA SCAN  06/09/2024    BMI FOLLOWUP  02/09/2025    ANNUAL WELLNESS VISIT  02/20/2025    TDAP/TD VACCINES (4 - Td or Tdap) 06/01/2029    INFLUENZA VACCINE  Completed    Pneumococcal Vaccine 65+  Completed    ZOSTER VACCINE  Completed                  CMS Preventative Services Quick Reference  Risk Factors Identified During Encounter  Immunizations Discussed/Encouraged: Influenza, Prevnar 20 (Pneumococcal 20-valent conjugate), Shingrix, COVID19, and RSV (Respiratory Syncytial Virus)  Dental Screening Recommended  Vision  "Screening Recommended  The above risks/problems have been discussed with the patient.  Pertinent information has been shared with the patient in the After Visit Summary.  An After Visit Summary and PPPS were made available to the patient.    Follow Up:   Next Medicare Wellness visit to be scheduled in 1 year.       Additional E&M Note during same encounter follows:  Patient has multiple medical problems which are significant and separately identifiable that require additional work above and beyond the Medicare Wellness Visit.      Chief Complaint  Medicare Wellness-subsequent    Subjective        HPI  Mariela Hopkins is also being seen today for follow-up on her chronic health conditions:    Anxiety.  The patient felt \"ungrounded\" on Zoloft in the past.  Patient feels she is doing well not on medication at this time.    Essential tremor.  She states that she has a history of essential tremor. She takes propranolol 20 mg 2-3 times daily and it really helps throughout the day with her shakiness. She denies any side effects.  She is seeing a new neurologist at this time and was recently referred for vestibular physical therapy.    Seizure disorder.  Currently she is seeing Neurology who is also monitoring tremor as well as seizures.  She continues to take Keppra as directed.         Objective   Vital Signs:  /68   Pulse 61   Ht 157.5 cm (62\")   Wt 72.5 kg (159 lb 12.8 oz)   SpO2 97%   BMI 29.23 kg/m²     Physical Exam  Vitals and nursing note reviewed.   Constitutional:       Appearance: Normal appearance. She is well-developed.   HENT:      Head: Normocephalic and atraumatic.   Eyes:      General: No scleral icterus.     Conjunctiva/sclera: Conjunctivae normal.   Cardiovascular:      Rate and Rhythm: Normal rate and regular rhythm.      Heart sounds: Normal heart sounds.   Pulmonary:      Effort: Pulmonary effort is normal.      Breath sounds: Normal breath sounds.   Musculoskeletal:         General: Normal " range of motion.      Cervical back: Normal range of motion and neck supple.      Right lower leg: No edema.      Left lower leg: No edema.   Skin:     General: Skin is warm and dry.      Capillary Refill: Capillary refill takes less than 2 seconds.      Findings: No rash.   Neurological:      Mental Status: She is alert and oriented to person, place, and time.   Psychiatric:         Behavior: Behavior normal.         Thought Content: Thought content normal.         Judgment: Judgment normal.          The following data was reviewed by: Marla Mccray DO on 02/20/2024:  Common labs          7/17/2023    14:08   Common Labs   Glucose 112    BUN 15    Creatinine 0.68    Sodium 142    Potassium 4.6    Chloride 107    Calcium 9.9    Total Protein 7.1    Albumin 4.4    Total Bilirubin 0.2    Alkaline Phosphatase 52    AST (SGOT) 15    ALT (SGPT) 13    WBC 7.9    Hemoglobin 14.0    Hematocrit 41.2    Platelets 282    Hemoglobin A1C 6.0      TSH          7/17/2023    14:08   TSH   TSH 1.220                 Assessment and Plan   Diagnoses and all orders for this visit:    1. Medicare annual wellness visit, subsequent (Primary)    2. Essential tremor    3. Elevated glucose  -     Hemoglobin A1c    4. Encounter for long-term (current) use of medications  -     Hemoglobin A1c  -     CBC & Differential  -     Comprehensive Metabolic Panel    RHM.  Up to date.  Vaccine recommendations discussed with patient.    Patient is also here for chronic stable essential tremor and elevated glucose.  Surveillance labs were obtained today and any medication changes will be made based on lab results and will be called to the patient later this week.          Follow Up   Return in about 6 months (around 8/20/2024) for prediabetes.  Patient was given instructions and counseling regarding her condition or for health maintenance advice. Please see specific information pulled into the AVS if appropriate.

## 2024-02-21 LAB
ALBUMIN SERPL-MCNC: 4.4 G/DL (ref 3.6–4.6)
ALBUMIN/GLOB SERPL: 1.6 {RATIO} (ref 1.2–2.2)
ALP SERPL-CCNC: 58 IU/L (ref 44–121)
ALT SERPL-CCNC: 12 IU/L (ref 0–32)
AST SERPL-CCNC: 18 IU/L (ref 0–40)
BASOPHILS # BLD AUTO: 0 X10E3/UL (ref 0–0.2)
BASOPHILS NFR BLD AUTO: 1 %
BILIRUB SERPL-MCNC: 0.3 MG/DL (ref 0–1.2)
BUN SERPL-MCNC: 19 MG/DL (ref 10–36)
BUN/CREAT SERPL: 29 (ref 12–28)
CALCIUM SERPL-MCNC: 9.7 MG/DL (ref 8.7–10.3)
CHLORIDE SERPL-SCNC: 104 MMOL/L (ref 96–106)
CO2 SERPL-SCNC: 23 MMOL/L (ref 20–29)
CREAT SERPL-MCNC: 0.65 MG/DL (ref 0.57–1)
EGFRCR SERPLBLD CKD-EPI 2021: 84 ML/MIN/1.73
EOSINOPHIL # BLD AUTO: 0.1 X10E3/UL (ref 0–0.4)
EOSINOPHIL NFR BLD AUTO: 2 %
ERYTHROCYTE [DISTWIDTH] IN BLOOD BY AUTOMATED COUNT: 12.2 % (ref 11.7–15.4)
GLOBULIN SER CALC-MCNC: 2.7 G/DL (ref 1.5–4.5)
GLUCOSE SERPL-MCNC: 99 MG/DL (ref 70–99)
HBA1C MFR BLD: 6.1 % (ref 4.8–5.6)
HCT VFR BLD AUTO: 40.6 % (ref 34–46.6)
HGB BLD-MCNC: 14.1 G/DL (ref 11.1–15.9)
IMM GRANULOCYTES # BLD AUTO: 0 X10E3/UL (ref 0–0.1)
IMM GRANULOCYTES NFR BLD AUTO: 0 %
LYMPHOCYTES # BLD AUTO: 3.4 X10E3/UL (ref 0.7–3.1)
LYMPHOCYTES NFR BLD AUTO: 42 %
MCH RBC QN AUTO: 31.9 PG (ref 26.6–33)
MCHC RBC AUTO-ENTMCNC: 34.7 G/DL (ref 31.5–35.7)
MCV RBC AUTO: 92 FL (ref 79–97)
MONOCYTES # BLD AUTO: 1.1 X10E3/UL (ref 0.1–0.9)
MONOCYTES NFR BLD AUTO: 13 %
NEUTROPHILS # BLD AUTO: 3.3 X10E3/UL (ref 1.4–7)
NEUTROPHILS NFR BLD AUTO: 42 %
PLATELET # BLD AUTO: 297 X10E3/UL (ref 150–450)
POTASSIUM SERPL-SCNC: 4.7 MMOL/L (ref 3.5–5.2)
PROT SERPL-MCNC: 7.1 G/DL (ref 6–8.5)
RBC # BLD AUTO: 4.42 X10E6/UL (ref 3.77–5.28)
SODIUM SERPL-SCNC: 142 MMOL/L (ref 134–144)
WBC # BLD AUTO: 7.9 X10E3/UL (ref 3.4–10.8)

## 2024-05-06 ENCOUNTER — TREATMENT (OUTPATIENT)
Dept: PHYSICAL THERAPY | Facility: CLINIC | Age: 89
End: 2024-05-06
Payer: MEDICARE

## 2024-05-06 DIAGNOSIS — H81.12 BPPV (BENIGN PAROXYSMAL POSITIONAL VERTIGO), LEFT: Primary | ICD-10-CM

## 2024-05-06 DIAGNOSIS — R42 VERTIGO: ICD-10-CM

## 2024-05-06 PROCEDURE — 97161 PT EVAL LOW COMPLEX 20 MIN: CPT | Performed by: PHYSICAL THERAPIST

## 2024-05-06 PROCEDURE — 95992 CANALITH REPOSITIONING PROC: CPT | Performed by: PHYSICAL THERAPIST

## 2024-05-06 PROCEDURE — 97530 THERAPEUTIC ACTIVITIES: CPT | Performed by: PHYSICAL THERAPIST

## 2024-05-13 ENCOUNTER — TREATMENT (OUTPATIENT)
Dept: PHYSICAL THERAPY | Facility: CLINIC | Age: 89
End: 2024-05-13
Payer: MEDICARE

## 2024-05-13 DIAGNOSIS — H81.12 BPPV (BENIGN PAROXYSMAL POSITIONAL VERTIGO), LEFT: Primary | ICD-10-CM

## 2024-05-13 DIAGNOSIS — R42 VERTIGO: ICD-10-CM

## 2024-05-13 PROCEDURE — 97530 THERAPEUTIC ACTIVITIES: CPT | Performed by: PHYSICAL THERAPIST

## 2024-05-13 PROCEDURE — 95992 CANALITH REPOSITIONING PROC: CPT | Performed by: PHYSICAL THERAPIST

## 2024-05-13 NOTE — PROGRESS NOTES
Physical Therapy Daily Treatment - Vestibular Note  [facility] [place]   [address] [place], [address]  [place] [address]  P: [phone]  F: [phone]    Patient: Mariela Hopkins   : 12/3/1933  Referring practitioner: Alberto Lion MD  Date of Initial Visit: Type: THERAPY  Noted: 2024  Today's Date: 2024  Patient seen for 2 sessions       Visit Diagnoses:    ICD-10-CM ICD-9-CM   1. BPPV (benign paroxysmal positional vertigo), left  H81.12 386.11   2. Vertigo  R42 780.4         Subjective:  Mariela Hopkins reports: I feel a little better overall but today I had some symptoms while getting my shirt out my closet.  I had my grandson's wedding this weekend and did a lot more including staying out late, so I am tired today.        Objective     Positional Testing:     Positional Testing  Positional Testing: With infrared goggles  Юлия-Hallpike Right: Right-beating Nystagmus  Юлия-Hallpike Left: Upbeat, left rotatory nystagmus  [place]-Hallpike Left Onset Time : 5 sec  [place]-Hallpike Left Duration Time : 3 sec  Horizontal Roll Test Right: No nystagmus  Horizontal Roll Test Left: No nystagmus                See Exercise and Vestibular Logs for complete testing and treatment.     S/P Rx provided pt was escorted to a chair with CGA to a full seated position.  Pt sat for 10 mins with head stationary.      Pt instructed not to lay down or do activities that change head level beyond 45 degrees from upright until laying down for bed for at least 2 to 3 hours.  Verbally discussed the treatment, reviewed the HEP (if given) and answered questions.        Assessment:  The pt continue to demo (L) PC involvement present, but the symptoms are less than prior.  Reviewed getting the head to recline down hill when performing the Epley.  The pt was symptomatic with that correction of performance in the clinic.        Plan:   Re-assess upon next visit for continued BPPV symptoms and treat  appropriately after testing.           Timed:         Neuromuscular Varsha:         mins  77741;    Therapeutic Activity:      15     mins  23064;           Un-Timed:  Canalith Repositioninig        15    mins 93000        Timed Treatment:   15   mins   Total Treatment:     40   mins      Robb Westbrook, PT  [place] License #: [id number]    Physical Therapist

## 2024-05-20 ENCOUNTER — TREATMENT (OUTPATIENT)
Dept: PHYSICAL THERAPY | Facility: CLINIC | Age: 89
End: 2024-05-20
Payer: MEDICARE

## 2024-05-20 DIAGNOSIS — R42 VERTIGO: ICD-10-CM

## 2024-05-20 DIAGNOSIS — H81.12 BPPV (BENIGN PAROXYSMAL POSITIONAL VERTIGO), LEFT: Primary | ICD-10-CM

## 2024-05-20 PROCEDURE — 97530 THERAPEUTIC ACTIVITIES: CPT | Performed by: PHYSICAL THERAPIST

## 2024-05-20 PROCEDURE — 95992 CANALITH REPOSITIONING PROC: CPT | Performed by: PHYSICAL THERAPIST

## 2024-05-20 NOTE — PROGRESS NOTES
Physical Therapy Daily Progress Note-Vestibular Rehab  [facility] [place]  [address], [address]  [facility] [address]        Patient: Mariela Hopkins   : 12/3/1933  Referring practitioner: Alberto Lion MD  Date of Initial Visit: Type: THERAPY  Noted: 2024  Today's Date: 2024  Patient seen for 3 sessions       Visit Diagnoses:    ICD-10-CM ICD-9-CM   1. BPPV (benign paroxysmal positional vertigo), left  H81.12 386.11   2. Vertigo  R42 780.4           Subjective:  Mariela Hopkins reports: adherence with Epley at home. Symptoms are a little worse, attributes this to having sinus congestion.       Objective     Positional Testing:     Positional Testing  Positional Testing: With infrared goggles  Юлия-Hallpike Right: Right-beating Nystagmus  [place]-Hallpike Left: Upbeat, left rotatory nystagmus  [place]-Hallpike Left Onset Time : 5 sec  Юлия-Hallpike Left Duration Time : 2 sec  Horizontal Roll Test Right: No nystagmus  Horizontal Roll Test Left: No nystagmus                See Exercise and Vestibular Logs for complete testing and treatment.     S/P Rx provided pt was escorted to a chair with CGA to a full seated position.  Pt sat for 10 mins with head stationary.      Pt instructed not to lay down or do activities that change head level beyond 45 degrees from upright until laying down for bed for at least 2 to 3 hours.  Verbally discussed the treatment, reviewed the HEP (if given) and answered questions.        Assessment:  Pt continues to present with signs consistent with L PC BPPV.       Plan:   Re-assess upon next visit for continued BPPV symptoms and treat appropriately after testing.           Timed:         Neuromuscular Varsha:     0    mins  72742;    Therapeutic Activity:      20     mins  77647;           Un-Timed:  Canalith Repositioninig        8    mins 09959      Timed Treatment:   20   mins   Total Treatment:     28   mins        Dot Torres, PT, DPT  Physical  Therapist  [place] license # [id number]

## 2024-06-05 ENCOUNTER — TREATMENT (OUTPATIENT)
Dept: PHYSICAL THERAPY | Facility: CLINIC | Age: 89
End: 2024-06-05
Payer: MEDICARE

## 2024-06-05 DIAGNOSIS — R42 VERTIGO: ICD-10-CM

## 2024-06-05 DIAGNOSIS — H81.12 BPPV (BENIGN PAROXYSMAL POSITIONAL VERTIGO), LEFT: Primary | ICD-10-CM

## 2024-06-05 NOTE — PROGRESS NOTES
Physical Therapy Daily Treatment - Vestibular Note  [facility] Physical Therapy [place]   [address], [address]  [place] [address]  P: [phone]  F: [phone]    Patient: Mariela Hopkins   : 12/3/1933  Referring practitioner: Alberto Lion MD  Date of Initial Visit: Type: THERAPY  Noted: 2024  Today's Date: 2024  Patient seen for 4 sessions       Visit Diagnoses:    ICD-10-CM ICD-9-CM   1. BPPV (benign paroxysmal positional vertigo), left  H81.12 386.11   2. Vertigo  R42 780.4         Subjective:  Mariela Hopkins reports: I feel like I am not connected.  I notice the symptoms more when I am nervous.        Objective     Positional Testing:     Positional Testing  Positional Testing: With infrared goggles  [place]-Hallpike Right: Left-beating Nystagmus  Юлия-Hallpike Right Onset Time : 5 sec  [place]-Hallpike Right Duration Time : > 1 min  Юлия-Hallpike Left: Left-beating Nystagmus  Юлия-Hallpike Left Onset Time : immediate  Юлия-Hallpike Left Duration Time : 31 sec                See Exercise and Vestibular Logs for complete testing and treatment.     S/P Rx provided pt was escorted to a chair with CGA to a full seated position.  Pt sat for 10 mins with head stationary.      Pt instructed not to lay down or do activities that change head level beyond 45 degrees from upright until laying down for bed for at least 2 to 3 hours.  Verbally discussed the treatment, reviewed the HEP (if given) and answered questions.        Assessment:  Continue with (L) PC tx, mainly focused on starting some vestibular strenghening.  The pt was symptomatic with testing, started vestibular strengthening within her tolerance in the clinic.        Plan:   Re-assess upon next visit for continued BPPV symptoms and treat appropriately after testing.            Timed:         Neuromuscular Varsha:     10    mins  50684;    Therapeutic Activity:      15     mins  62209;           Un-Timed:  Canalith Repositioninig         15    mins 65565        Timed Treatment:   40   mins   Total Treatment:     50   mins      Robb Westbrook, PT  [place] License #: [id number]    Physical Therapist

## 2024-06-10 ENCOUNTER — TREATMENT (OUTPATIENT)
Dept: PHYSICAL THERAPY | Facility: CLINIC | Age: 89
End: 2024-06-10
Payer: MEDICARE

## 2024-06-10 DIAGNOSIS — H81.12 BPPV (BENIGN PAROXYSMAL POSITIONAL VERTIGO), LEFT: Primary | ICD-10-CM

## 2024-06-10 DIAGNOSIS — R42 VERTIGO: ICD-10-CM

## 2024-06-10 NOTE — PROGRESS NOTES
Physical Therapy Daily Treatment - Vestibular Note    PROGRESS NOTE  [facility] Physical Therapy [place]   [address] [place] [address], [address]  [place] [address]  P: [phone]  F: [phone]    Patient: Mariela Hopkins   : 12/3/1933  Referring practitioner: Alberto Lion MD  Date of Initial Visit: Type: THERAPY  Noted: 2024  Today's Date: 6/10/2024  Patient seen for 5 sessions       Visit Diagnoses:    ICD-10-CM ICD-9-CM   1. BPPV (benign paroxysmal positional vertigo), left  H81.12 386.11   2. Vertigo  R42 780.4         Subjective:  Mariela Hopkins reports: The Thursday after my last visit I started the Eye exercise (Saccades) for 3 x 60 secs and I had a lot of vertigo symptoms afterwards.  I had to go to the ER yesterday because I have been really dizzy and bad off since I did the exercise.  I went back and read, that I was only suppose to do 3 x 15 secs.  They prescribed Anti-vert and that has helped calm me down a lot.  I took the anti-vert this morning.        Objective     Positional Testing:                      See Exercise and Vestibular Logs for complete testing and treatment.     S/P Rx provided pt was escorted to a chair with CGA to a full seated position.  Pt sat for 10 mins with head stationary.      Pt instructed not to lay down or do activities that change head level beyond 45 degrees from upright until laying down for bed for at least 2 to 3 hours.  Verbally discussed the treatment, reviewed the HEP (if given) and answered questions.        Assessment:  Reviewed all Tx and what we are doing from today on.  She is to take the today and tomorrow off and return to exercises starting Wednesday at a less time as discussed.  The pt has seem to over tax her vestibular system and needs some more time to calm down before continued BPPV and vestibular Tx.  No BPPV testing or treatment today secondary to the pt taking Antivert this morning.  She was able perform vestibular strengthening  with less time.    Goals  Plan Goals: SHORT TERM GOALS: Time for Goal: 4 weeks    1.  Pt reports that understand the information about BPPV and the treatment. - MET  2.  Pt to understand, not to lay down the rest of the day secondary to BPPV Tx. - MET     LONG TERM GOALS: Time for Goal: 8 weeks  1.  Pt to report absence of vertigo symptoms with all ADL's, IADL's, household, recreational and community activities, including all motions and positions.  - PROGRESSING    Plan:   Re-assess upon next visit for continued BPPV symptoms and treat appropriately after testing, including vestibular strengthening.      Continue for 1-2x wk for 6 weeks.          Timed:         Neuromuscular Varsha:     10    mins  70506;    Therapeutic Activity:      15     mins  34085;           Un-Timed:  Canalith Repositioninig            mins 18334        Timed Treatment:   25   mins   Total Treatment:     25   mins      Robb Westbrook PT  [place] License #: [id number]    Physical Therapist

## 2024-06-17 ENCOUNTER — TREATMENT (OUTPATIENT)
Dept: PHYSICAL THERAPY | Facility: CLINIC | Age: 89
End: 2024-06-17
Payer: MEDICARE

## 2024-06-17 DIAGNOSIS — R42 VERTIGO: ICD-10-CM

## 2024-06-17 DIAGNOSIS — H81.12 BPPV (BENIGN PAROXYSMAL POSITIONAL VERTIGO), LEFT: Primary | ICD-10-CM

## 2024-06-17 NOTE — PROGRESS NOTES
Physical Therapy Daily Treatment Note  [facility] Physical Therapy [place]   [address], [address]  [place] [address]  P: [phone]  F: [phone]    Patient: Mariela Hopkins   : 12/3/1933  Referring practitioner: Alberto Lion MD  Date of Initial Visit: Type: THERAPY  Noted: 2024  Today's Date: 2024  Patient seen for 6 sessions       Visit Diagnoses:    ICD-10-CM ICD-9-CM   1. BPPV (benign paroxysmal positional vertigo), left  H81.12 386.11   2. Vertigo  R42 780.4         Subjective:  Mariela Hopkins reports: I am doing better with much less vertigo symptoms.  I have stopped taking the Anti-vert, but I have only done the HEP(Saccades) 1x and that was this morning.        Objective   See Exercise and vestibular Logs for complete treatment.       Assessment:  The pt was able to perform the saccades and smooth pursuits in the clinic for 3 x 10 sec each.  She was symptomatic afterwards but only minimally.  Thoroughly reviewed the difference in BPPV and vestibular weakness and the work to recover from both.        Plan:   Progress per Plan of Care          Timed:         Manual Therapy:         mins  93289;     Therapeutic Exercise:         mins  27818;     Neuromuscular Varsha:    15    mins  34389;    Therapeutic Activity:     15     mins  98103;     Gait Training:           mins  48620;     Ultrasound:          mins  89563;    Ionto                                   mins  23648  Self Care                            mins  05462  Traction          mins 12887      Un-Timed:  Canalith Repos         mins 29268  Electrical Stimulation:         mins  70220 ( );  Dry Needling          mins self-pay  Traction          mins 49273        Timed Treatment:   30   mins   Total Treatment:     30   mins    Robb Westbrook PT  [place] License #: [id number]    Physical Therapist

## 2024-06-21 ENCOUNTER — TREATMENT (OUTPATIENT)
Dept: PHYSICAL THERAPY | Facility: CLINIC | Age: 89
End: 2024-06-21
Payer: MEDICARE

## 2024-06-21 DIAGNOSIS — H81.12 BPPV (BENIGN PAROXYSMAL POSITIONAL VERTIGO), LEFT: Primary | ICD-10-CM

## 2024-06-21 DIAGNOSIS — R42 VERTIGO: ICD-10-CM

## 2024-06-21 NOTE — PROGRESS NOTES
Physical Therapy Daily Treatment - Vestibular Note  [facility] Physical Therapy [place]   [address], [address]  [place] [address]  P: [phone]  F: [phone]    Patient: Mariela Hopkins   : 12/3/1933  Referring practitioner: Alberto Lion MD  Date of Initial Visit: Type: THERAPY  Noted: 2024  Today's Date: 2024  Patient seen for 7 sessions       Visit Diagnoses:    ICD-10-CM ICD-9-CM   1. BPPV (benign paroxysmal positional vertigo), left  H81.12 386.11   2. Vertigo  R42 780.4         Subjective:  Mariela Hopkins reports: I notice that when I have nervousness or am involved in a situation that stressful, I feel disconnected.   The nervousness symptoms go away but the disconnected symptoms don't.  When I do the HEP I occasionally have headaches.  I am doing the HEP 2x day for 3 x 15 sec, I have felt heaviness in my (L) ear afterwards.      Objective     Positional Testing:                      See Exercise and Vestibular Logs for complete testing and treatment.     S/P Rx provided pt was escorted to a chair with CGA to a full seated position.  Pt sat for 10 mins with head stationary.      Pt instructed not to lay down or do activities that change head level beyond 45 degrees from upright until laying down for bed for at least 2 to 3 hours.  Verbally discussed the treatment, reviewed the HEP (if given) and answered questions.        Assessment:  The pt seems to be doing better with her management of vestibular weakness.  She has made the correlation of increased nervousness and increased vertigo symptoms.  The pt is bennie Tx well.  Was able progress tx though the pt had some increase in symptoms.        Plan:   Re-assess upon next visit for continued BPPV symptoms and treat appropriately after testing.           Timed:         Neuromuscular Varsha:     15    mins  82469;    Therapeutic Activity:      15     mins  25603;           Un-Timed:  Canalith Repositioninig            mins  00870        Timed Treatment:   30   mins   Total Treatment:     30   mins      Robb Westbrook, PT  [place] License #: [id number]    Physical Therapist

## 2024-07-01 ENCOUNTER — TREATMENT (OUTPATIENT)
Dept: PHYSICAL THERAPY | Facility: CLINIC | Age: 89
End: 2024-07-01
Payer: MEDICARE

## 2024-07-01 DIAGNOSIS — H81.12 BPPV (BENIGN PAROXYSMAL POSITIONAL VERTIGO), LEFT: Primary | ICD-10-CM

## 2024-07-01 DIAGNOSIS — R42 VERTIGO: ICD-10-CM

## 2024-07-01 NOTE — PROGRESS NOTES
Physical Therapy Daily Treatment - Vestibular Note    Discharge Note   [facility] [place]   [address], [address]  [place] [address]  P: [phone]  F: [phone]    Patient: Mariela Hopkins   : 12/3/1933  Referring practitioner: Alberto Lion MD  Date of Initial Visit: Type: THERAPY  Noted: 2024  Today's Date: 2024  Patient seen for 8 sessions       Visit Diagnoses:    ICD-10-CM ICD-9-CM   1. BPPV (benign paroxysmal positional vertigo), left  H81.12 386.11   2. Vertigo  R42 780.4         Subjective:  Mariela Hopkins reports: I am doing better with less vertigo like symptoms.  I don't feel as disconnected.  I have been doing my HEP and I feel more connected.  I have been spacing out the time me doing the HEP and that seems to help.  The diagonal ones seem to be the most challenging.  I feel good and ready to DC if ok.  No symptoms with laying down, sitting up or moving my head.      Attended with daughterTigist.    Objective     Positional Testing:                      See Exercise and Vestibular Logs for complete testing and treatment.     S/P Rx provided pt was escorted to a chair with CGA to a full seated position.  Pt sat for 10 mins with head stationary.      Pt instructed not to lay down or do activities that change head level beyond 45 degrees from upright until laying down for bed for at least 2 to 3 hours.  Verbally discussed the treatment, reviewed the HEP (if given) and answered questions.        Assessment:  The pt demos (I) w/ her HEP.  Extensively reviewed the HEP and the progression of the HEP with pt and her daughter.  The pt demos improvement with the performance of the vestibular strengthening, helping to improve her vestibular systems response.  She will continue her HEP, but at this time no further skilled care is indicated.  Thoroughly reviewed the HEP, progression and when it is appropriate to return to PT.        Plan:   DC to HEP.  Pt  instructed to call with questions or issues related to her vertigo.            Timed:         Neuromuscular Varsha:     15    mins  43108;    Therapeutic Activity:      25     mins  50824;           Un-Timed:  Canalith Repositioninig            mins 54843        Timed Treatment:   40   mins   Total Treatment:     40   mins      Robb Westbrook, PT  [place] License #: [id number]    Physical Therapist

## 2024-08-27 ENCOUNTER — TELEPHONE (OUTPATIENT)
Dept: FAMILY MEDICINE CLINIC | Facility: CLINIC | Age: 89
End: 2024-08-27

## 2024-09-10 ENCOUNTER — OFFICE VISIT (OUTPATIENT)
Dept: FAMILY MEDICINE CLINIC | Facility: CLINIC | Age: 89
End: 2024-09-10
Payer: MEDICARE

## 2024-09-10 VITALS
HEART RATE: 74 BPM | TEMPERATURE: 97.8 F | SYSTOLIC BLOOD PRESSURE: 114 MMHG | DIASTOLIC BLOOD PRESSURE: 60 MMHG | OXYGEN SATURATION: 99 % | BODY MASS INDEX: 29.22 KG/M2 | WEIGHT: 158.8 LBS | HEIGHT: 62 IN

## 2024-09-10 DIAGNOSIS — G25.0 ESSENTIAL TREMOR: ICD-10-CM

## 2024-09-10 DIAGNOSIS — R42 VERTIGO: Primary | ICD-10-CM

## 2024-09-10 DIAGNOSIS — R29.898 MUSCULAR DECONDITIONING: ICD-10-CM

## 2024-09-10 PROCEDURE — 1159F MED LIST DOCD IN RCRD: CPT | Performed by: FAMILY MEDICINE

## 2024-09-10 PROCEDURE — 1126F AMNT PAIN NOTED NONE PRSNT: CPT | Performed by: FAMILY MEDICINE

## 2024-09-10 PROCEDURE — 1160F RVW MEDS BY RX/DR IN RCRD: CPT | Performed by: FAMILY MEDICINE

## 2024-09-10 PROCEDURE — 99214 OFFICE O/P EST MOD 30 MIN: CPT | Performed by: FAMILY MEDICINE

## 2024-09-10 NOTE — PROGRESS NOTES
"Chief Complaint  Med Refill (Went to urgent care yesterday for dizziness ) and Anxiety    Subjective        Mariela Hopkins presents to [facility]  History of Present Illness  Patient is here follow-up on her chronic health conditions:    Anxiety.  The patient felt \"ungrounded\" on Zoloft in the past.  Patient feels she is doing well not on medication at this time.    Essential tremor.  She states that she has a history of essential tremor. She takes propranolol 20 mg 3 times daily and it helps throughout the day with her shakiness. She denies any side effects.  She is seeing a new neurologist at this time and was recently referred for vestibular physical therapy.  The vestibular physical therapy seems to help with some vertigo symptoms but the patient states that she is more tremulous upon standing.  She had been more sedentary lately and has not been up to exercise much.  She states that her tremor gets much worse upon standing and walking.      Seizure disorder.  Currently she is seeing Neurology who is also monitoring tremor as well as seizures.  She continues to take Keppra as directed.      Objective   Vital Signs:  /60   Pulse 74   Temp 97.8 °F (36.6 °C)   Ht 157.5 cm (62\")   Wt 72 kg (158 lb 12.8 oz)   SpO2 99%   BMI 29.04 kg/m²   Estimated body mass index is 29.04 kg/m² as calculated from the following:    Height as of this encounter: 157.5 cm (62\").    Weight as of this encounter: 72 kg (158 lb 12.8 oz).         Physical Exam  Vitals and nursing note reviewed.   Constitutional:       Appearance: Normal appearance. She is well-developed.   HENT:      Head: Normocephalic and atraumatic.   Eyes:      General: No scleral icterus.     Conjunctiva/sclera: Conjunctivae normal.   Cardiovascular:      Rate and Rhythm: Normal rate and regular rhythm.      Heart sounds: Normal heart sounds.   Pulmonary:      Effort: Pulmonary effort is normal.      Breath sounds: Normal " breath sounds.   Musculoskeletal:         General: Normal range of motion.      Cervical back: Normal range of motion and neck supple.      Right lower leg: No edema.      Left lower leg: No edema.      Comments: Patient is slow to get up from seated to standing and after standing for a few seconds has increased tremor and weakness and then feels that she needs to sit down again.   Skin:     General: Skin is warm and dry.      Capillary Refill: Capillary refill takes less than 2 seconds.      Findings: No rash.   Neurological:      Mental Status: She is alert and oriented to person, place, and time.   Psychiatric:         Mood and Affect: Mood normal.         Behavior: Behavior normal.         Thought Content: Thought content normal.         Judgment: Judgment normal.        Result Review :  The following data was reviewed by: Marla Mccray DO on 09/10/2024:  Common labs          2/20/2024    14:59 6/9/2024    12:36   Common Labs   Glucose 99     BUN 19     Creatinine 0.65     Sodium 142     Potassium 4.7     Chloride 104     Calcium 9.7     Total Protein 7.1     Albumin 4.4     Total Bilirubin 0.3     Alkaline Phosphatase 58     AST (SGOT) 18     ALT (SGPT) 12     WBC 7.9  10.31       Hemoglobin 14.1  13.7       Hematocrit 40.6  41.8       Platelets 297  308       Hemoglobin A1C 6.1        Details          This result is from an external source.                         Assessment and Plan   Diagnoses and all orders for this visit:    1. Vertigo (Primary)  -     Cancel: Ambulatory Referral to Home Health  -     Ambulatory Referral to Home Health    2. Muscular deconditioning  -     Cancel: Ambulatory Referral to Home Health  -     Ambulatory Referral to Home Health    3. Essential tremor  -     Cancel: Ambulatory Referral to Home Health  -     Ambulatory Referral to Home Health    Patient is here today for follow-up of worsening symptoms of tremor.  I feel the reason for the patient's tremor upon standing is due  to deconditioning.  I do feel that she gets benefit from the propranolol and have advised her on taking the third dose earlier in the evening than at bedtime.  Also I will be ordering home health for strength training to get some of her strength back.    Vertigo seems to be well-controlled and the patient will continue with physical therapy as needed.         Follow Up   Return in about 5 months (around 2/24/2025) for Medicare Wellness.  Patient was given instructions and counseling regarding her condition or for health maintenance advice. Please see specific information pulled into the AVS if appropriate.

## 2025-02-26 ENCOUNTER — OFFICE VISIT (OUTPATIENT)
Dept: FAMILY MEDICINE CLINIC | Facility: CLINIC | Age: OVER 89
End: 2025-02-26
Payer: MEDICARE

## 2025-02-26 VITALS
OXYGEN SATURATION: 96 % | BODY MASS INDEX: 29.26 KG/M2 | HEIGHT: 62 IN | HEART RATE: 66 BPM | WEIGHT: 159 LBS | SYSTOLIC BLOOD PRESSURE: 135 MMHG | DIASTOLIC BLOOD PRESSURE: 68 MMHG

## 2025-02-26 DIAGNOSIS — R73.09 ELEVATED GLUCOSE: ICD-10-CM

## 2025-02-26 DIAGNOSIS — Z00.00 MEDICARE ANNUAL WELLNESS VISIT, SUBSEQUENT: Primary | ICD-10-CM

## 2025-02-26 DIAGNOSIS — F43.23 ACUTE ADJUSTMENT DISORDER WITH MIXED ANXIETY AND DEPRESSED MOOD: ICD-10-CM

## 2025-02-26 DIAGNOSIS — G25.0 ESSENTIAL TREMOR: ICD-10-CM

## 2025-02-26 DIAGNOSIS — Z79.899 ENCOUNTER FOR LONG-TERM (CURRENT) USE OF MEDICATIONS: ICD-10-CM

## 2025-02-26 PROCEDURE — G0439 PPPS, SUBSEQ VISIT: HCPCS | Performed by: FAMILY MEDICINE

## 2025-02-26 PROCEDURE — 1170F FXNL STATUS ASSESSED: CPT | Performed by: FAMILY MEDICINE

## 2025-02-26 PROCEDURE — 1125F AMNT PAIN NOTED PAIN PRSNT: CPT | Performed by: FAMILY MEDICINE

## 2025-02-26 PROCEDURE — 1159F MED LIST DOCD IN RCRD: CPT | Performed by: FAMILY MEDICINE

## 2025-02-26 PROCEDURE — 1160F RVW MEDS BY RX/DR IN RCRD: CPT | Performed by: FAMILY MEDICINE

## 2025-02-26 PROCEDURE — 99214 OFFICE O/P EST MOD 30 MIN: CPT | Performed by: FAMILY MEDICINE

## 2025-02-26 RX ORDER — FLUTICASONE PROPIONATE 50 MCG
2 SPRAY, SUSPENSION (ML) NASAL DAILY
Qty: 16 G | Refills: 7 | Status: SHIPPED | OUTPATIENT
Start: 2025-02-26

## 2025-02-26 RX ORDER — PROPRANOLOL HCL 20 MG
20 TABLET ORAL 4 TIMES DAILY
Qty: 360 TABLET | Refills: 3 | Status: SHIPPED | OUTPATIENT
Start: 2025-02-26

## 2025-02-26 RX ORDER — FLUOXETINE 10 MG/1
10 CAPSULE ORAL DAILY
Qty: 30 CAPSULE | Refills: 1 | Status: SHIPPED | OUTPATIENT
Start: 2025-02-26

## 2025-02-26 NOTE — PROGRESS NOTES
Subjective   The ABCs of the Annual Wellness Visit  Medicare Wellness Visit      Mariela Hopkins is a 89 y.o. patient who presents for a Medicare Wellness Visit.    The following portions of the patient's history were reviewed and   updated as appropriate: allergies, current medications, past family history, past medical history, past social history, past surgical history, and problem list.    Compared to one year ago, the patient's physical   health is the same.  Compared to one year ago, the patient's mental   health is the same.    Recent Hospitalizations:  She was not admitted to the hospital during the last year.     Current Medical Providers:  Patient Care Team:  Marla Mccray DO as PCP - General (Family Medicine)    Outpatient Medications Prior to Visit   Medication Sig Dispense Refill    levETIRAcetam (KEPPRA) 500 MG tablet Take 1 tablet by mouth 2 (Two) Times a Day. 180 tablet 3    Magnesium Hydroxide (MAGNESIA PO) Take  by mouth.      meclizine (ANTIVERT) 25 MG tablet Take 1 tablet by mouth 3 (Three) Times a Day.      multivitamin with minerals tablet tablet Take 1 tablet by mouth Daily.      Olopatadine HCl (PATADAY OP) Apply  to eye(s) as directed by provider.      Polyethylene Glycol 3350 (MIRALAX PO) Take  by mouth.      vitamin D (ERGOCALCIFEROL) 1.25 MG (81743 UT) capsule capsule Take 3,000 Units by mouth Daily.      fluticasone (FLONASE) 50 MCG/ACT nasal spray USE 2 SPRAYS NASALLY DAILY AS DIRECTED BY PROVIDER 16 g 5    propranolol (INDERAL) 20 MG tablet Take 1 tablet by mouth 3 (Three) Times a Day. 270 tablet 3    vitamin C (ASCORBIC ACID) 250 MG tablet Take 1 tablet by mouth Daily. (Patient not taking: Reported on 2/26/2025)       No facility-administered medications prior to visit.     No opioid medication identified on active medication list. I have reviewed chart for other potential  high risk medication/s and harmful drug interactions in the elderly.      Aspirin is not on active  "medication list.  Aspirin use is not indicated based on review of current medical condition/s. Risk of harm outweighs potential benefits.  .    Patient Active Problem List   Diagnosis    Aortic valve calcification    Sleep apnea    Focal epilepsy    Tremor    Spinning sensation     Advance Care Planning Advance Directive is on file.  ACP discussion was held with the patient during this visit. Patient has an advance directive in EMR which is still valid.             Objective   Vitals:    02/26/25 1105   BP: 135/68   Pulse: 66   SpO2: 96%   Weight: 72.1 kg (159 lb)   Height: 157.5 cm (62\")   PainSc: 5    PainLoc: Neck       Estimated body mass index is 29.08 kg/m² as calculated from the following:    Height as of this encounter: 157.5 cm (62\").    Weight as of this encounter: 72.1 kg (159 lb).    BMI is >= 25 and <30. (Overweight) The following options were offered after discussion;: exercise counseling/recommendations and nutrition counseling/recommendations           Does the patient have evidence of cognitive impairment? No                                                                                                Health  Risk Assessment    Smoking Status:  Social History     Tobacco Use   Smoking Status Never   Smokeless Tobacco Never   Tobacco Comments    caffeine use- \"occ\"     Alcohol Consumption:  Social History     Substance and Sexual Activity   Alcohol Use Yes    Comment: beer,wine       Fall Risk Screen  STEADI Fall Risk Assessment was completed, and patient is at HIGH risk for falls. Assessment completed on:2/26/2025    Depression Screening   Little interest or pleasure in doing things? Over half   Feeling down, depressed, or hopeless? Several days   PHQ-2 Total Score 3   Trouble falling or staying asleep, or sleeping too much? Not at all   Feeling tired or having little energy? Over half   Poor appetite or overeating? Not at all   Feeling bad about yourself - or that you are a failure or have let " yourself or your family down? Not at all   Trouble concentrating on things, such as reading the newspaper or watching television? Not at all   Moving or speaking so slowly that other people could have noticed? Or the opposite - being so fidgety or restless that you have been moving around a lot more than usual? Not at all   Thoughts that you would be better off dead, or of hurting yourself in some way? Not at all   PHQ-9 Total Score 5   If you checked off any problems, how difficult have these problems made it for you to do your work, take care of things at home, or get along with other people? Very difficult      Health Habits and Functional and Cognitive Screenin/26/2025    11:07 AM   Functional & Cognitive Status   Do you have difficulty preparing food and eating? Yes   Do you have difficulty bathing yourself, getting dressed or grooming yourself? Yes   Do you have difficulty using the toilet? No   Do you have difficulty moving around from place to place? No   Do you have trouble with steps or getting out of a bed or a chair? No   Current Diet Well Balanced Diet   Dental Exam Up to date   Eye Exam Up to date   Exercise (times per week) 3 times per week   Current Exercises Include Home Exercise Program (TV, Computer, Etc.)   Do you need help using the phone?  No   Are you deaf or do you have serious difficulty hearing?  Yes   Do you need help to go to places out of walking distance? No   Do you need help shopping? Yes   Do you need help preparing meals?  Yes   Do you need help with housework?  Yes   Do you need help with laundry? Yes   Do you need help taking your medications? No   Do you need help managing money? No   Do you ever drive or ride in a car without wearing a seat belt? No   Have you felt unusual stress, anger or loneliness in the last month? Yes   Who do you live with? Community   If you need help, do you have trouble finding someone available to you? No   Have you been bothered in the last  four weeks by sexual problems? No   Do you have difficulty concentrating, remembering or making decisions? No           Age-appropriate Screening Schedule:  Refer to the list below for future screening recommendations based on patient's age, sex and/or medical conditions. Orders for these recommended tests are listed in the plan section. The patient has been provided with a written plan.    Health Maintenance List  Health Maintenance   Topic Date Due    RSV Vaccine - Adults (1 - 1-dose 75+ series) Never done    COVID-19 Vaccine (5 - 2024-25 season) 09/01/2024    BMI FOLLOWUP  02/09/2025    ANNUAL WELLNESS VISIT  02/20/2025    DXA SCAN  01/01/2026 (Originally 6/9/2024)    TDAP/TD VACCINES (4 - Td or Tdap) 06/01/2029    INFLUENZA VACCINE  Completed    Pneumococcal Vaccine 50+  Completed    ZOSTER VACCINE  Completed                                                                                                                                                CMS Preventative Services Quick Reference  Risk Factors Identified During Encounter  Fall Risk-High or Moderate: Discussed Fall Prevention in the home  Immunizations Discussed/Encouraged: Influenza, Prevnar 20 (Pneumococcal 20-valent conjugate), Shingrix, COVID19, and RSV (Respiratory Syncytial Virus)  Dental Screening Recommended  Vision Screening Recommended    The above risks/problems have been discussed with the patient.  Pertinent information has been shared with the patient in the After Visit Summary.  An After Visit Summary and PPPS were made available to the patient.    Follow Up:   Next Medicare Wellness visit to be scheduled in 1 year.         Additional E&M Note during same encounter follows:  Patient has additional, significant, and separately identifiable condition(s)/problem(s) that require work above and beyond the Medicare Wellness Visit     Chief Complaint  Medicare Wellness-subsequent    Subjective   HPI  Mariela is also being seen today for an annual  "adult preventative physical exam.  and Mariela is also being seen today for follow-up on her chronic health conditions:    Anxiety.  The patient felt \"ungrounded\" on Zoloft in the past.  Recently however the patient has been feeling a little more depressed.  She is lacking motivation and seems to be withdrawing some.  Her daughter raised this concern.    Essential tremor.  She states that she has a history of essential tremor. She takes propranolol 20 mg 3 times daily. She denies any side effects.  She saw neurologist and was referred for vestibular physical therapy in the past.    Seizure disorder.  Currently she is seeing Neurology who is also monitoring tremor as well as seizures.  She continues to take Keppra as directed.            Objective   Vital Signs:  /68   Pulse 66   Ht 157.5 cm (62\")   Wt 72.1 kg (159 lb)   SpO2 96%   BMI 29.08 kg/m²   Physical Exam  Vitals and nursing note reviewed.   Constitutional:       Appearance: She is well-developed.   HENT:      Head: Normocephalic and atraumatic.      Right Ear: External ear normal.      Left Ear: External ear normal.      Nose: Nose normal.   Eyes:      General: No scleral icterus.     Conjunctiva/sclera: Conjunctivae normal.   Cardiovascular:      Rate and Rhythm: Normal rate and regular rhythm.      Heart sounds: Normal heart sounds.   Pulmonary:      Effort: Pulmonary effort is normal.      Breath sounds: Normal breath sounds.   Musculoskeletal:      Cervical back: Normal range of motion and neck supple.      Right lower leg: No edema.      Left lower leg: No edema.   Lymphadenopathy:      Cervical: No cervical adenopathy.   Skin:     General: Skin is warm and dry.      Findings: No rash.   Neurological:      Mental Status: She is alert and oriented to person, place, and time.   Psychiatric:         Mood and Affect: Mood normal.         Behavior: Behavior normal.         Thought Content: Thought content normal.         Judgment: Judgment normal. "             Common labs          6/9/2024    12:36   Common Labs   WBC 10.31       Hemoglobin 13.7       Hematocrit 41.8       Platelets 308          Details          This result is from an external source.                       Assessment and Plan Additional age appropriate preventative wellness advice topics were discussed during today's preventative wellness exam(some topics already addressed during AWV portion of the note above):    Physical Activity: Advised cardiovascular activity 150 minutes per week as tolerated. (example brisk walk for 30 minutes, 5 days a week).     Nutrition: Discussed nutrition plan with patient. Information shared in after visit summary. Goal is for a well balanced diet to enhance overall health.     Healthy Weight: Discussed current and goal BMI with patient. Steps to attain this goal discussed. Information shared in after visit summary.           Medicare annual wellness visit, subsequent         Essential tremor    Orders:    propranolol (INDERAL) 20 MG tablet; Take 1 tablet by mouth 4 (Four) Times a Day.    Elevated glucose    Orders:    Hemoglobin A1c    Encounter for long-term (current) use of medications    Orders:    CBC & Differential    Comprehensive Metabolic Panel    TSH    Lipid Panel    Acute adjustment disorder with mixed anxiety and depressed mood      Orders:    FLUoxetine (PROzac) 10 MG capsule; Take 1 capsule by mouth Daily.    RHM.  Patient refuses DEXA scan.  Vaccine recommendations discussed.    Patient is also here to follow-up on chronic stable essential tremor, elevated glucose. Surveillance labs were obtained today and any medication changes will be made based on lab results and will be called to the patient later this week.     Patient is also here with worsening symptoms of adjustment disorder with depression.  I will start trial of fluoxetine 10 mg.  Follow-up in 6 weeks. Sooner prn       Follow Up   Return in about 7 weeks (around 4/16/2025) for  Depression.  Patient was given instructions and counseling regarding her condition or for health maintenance advice. Please see specific information pulled into the AVS if appropriate.

## 2025-02-27 LAB
ALBUMIN SERPL-MCNC: 4.4 G/DL (ref 3.6–4.6)
ALP SERPL-CCNC: 56 IU/L (ref 44–121)
ALT SERPL-CCNC: 13 IU/L (ref 0–32)
AST SERPL-CCNC: 21 IU/L (ref 0–40)
BASOPHILS # BLD AUTO: 0 X10E3/UL (ref 0–0.2)
BASOPHILS NFR BLD AUTO: 0 %
BILIRUB SERPL-MCNC: 0.4 MG/DL (ref 0–1.2)
BUN SERPL-MCNC: 15 MG/DL (ref 10–36)
BUN/CREAT SERPL: 23 (ref 12–28)
CALCIUM SERPL-MCNC: 10.2 MG/DL (ref 8.7–10.3)
CHLORIDE SERPL-SCNC: 104 MMOL/L (ref 96–106)
CHOLEST SERPL-MCNC: 197 MG/DL (ref 100–199)
CO2 SERPL-SCNC: 25 MMOL/L (ref 20–29)
CREAT SERPL-MCNC: 0.65 MG/DL (ref 0.57–1)
EGFRCR SERPLBLD CKD-EPI 2021: 83 ML/MIN/1.73
EOSINOPHIL # BLD AUTO: 0.1 X10E3/UL (ref 0–0.4)
EOSINOPHIL NFR BLD AUTO: 1 %
ERYTHROCYTE [DISTWIDTH] IN BLOOD BY AUTOMATED COUNT: 12.8 % (ref 11.7–15.4)
GLOBULIN SER CALC-MCNC: 3 G/DL (ref 1.5–4.5)
GLUCOSE SERPL-MCNC: 83 MG/DL (ref 70–99)
HBA1C MFR BLD: 6.1 % (ref 4.8–5.6)
HCT VFR BLD AUTO: 43.5 % (ref 34–46.6)
HDLC SERPL-MCNC: 54 MG/DL
HGB BLD-MCNC: 14.2 G/DL (ref 11.1–15.9)
IMM GRANULOCYTES # BLD AUTO: 0 X10E3/UL (ref 0–0.1)
IMM GRANULOCYTES NFR BLD AUTO: 0 %
LDLC SERPL CALC-MCNC: 122 MG/DL (ref 0–99)
LYMPHOCYTES # BLD AUTO: 3.3 X10E3/UL (ref 0.7–3.1)
LYMPHOCYTES NFR BLD AUTO: 37 %
MCH RBC QN AUTO: 30.9 PG (ref 26.6–33)
MCHC RBC AUTO-ENTMCNC: 32.6 G/DL (ref 31.5–35.7)
MCV RBC AUTO: 95 FL (ref 79–97)
MONOCYTES # BLD AUTO: 1 X10E3/UL (ref 0.1–0.9)
MONOCYTES NFR BLD AUTO: 11 %
NEUTROPHILS # BLD AUTO: 4.5 X10E3/UL (ref 1.4–7)
NEUTROPHILS NFR BLD AUTO: 51 %
PLATELET # BLD AUTO: 307 X10E3/UL (ref 150–450)
POTASSIUM SERPL-SCNC: 4.5 MMOL/L (ref 3.5–5.2)
PROT SERPL-MCNC: 7.4 G/DL (ref 6–8.5)
RBC # BLD AUTO: 4.6 X10E6/UL (ref 3.77–5.28)
SODIUM SERPL-SCNC: 142 MMOL/L (ref 134–144)
TRIGL SERPL-MCNC: 117 MG/DL (ref 0–149)
TSH SERPL DL<=0.005 MIU/L-ACNC: 1.75 UIU/ML (ref 0.45–4.5)
VLDLC SERPL CALC-MCNC: 21 MG/DL (ref 5–40)
WBC # BLD AUTO: 9 X10E3/UL (ref 3.4–10.8)

## 2025-03-26 DIAGNOSIS — R25.1 TREMOR: ICD-10-CM

## 2025-03-26 DIAGNOSIS — G40.109 FOCAL EPILEPSY: ICD-10-CM

## 2025-03-26 RX ORDER — LEVETIRACETAM 500 MG/1
500 TABLET ORAL 2 TIMES DAILY
Qty: 180 TABLET | Refills: 0 | Status: SHIPPED | OUTPATIENT
Start: 2025-03-26

## 2025-04-13 DIAGNOSIS — F43.23 ACUTE ADJUSTMENT DISORDER WITH MIXED ANXIETY AND DEPRESSED MOOD: ICD-10-CM

## 2025-04-14 RX ORDER — FLUOXETINE 10 MG/1
10 CAPSULE ORAL DAILY
Qty: 90 CAPSULE | Refills: 1 | Status: SHIPPED | OUTPATIENT
Start: 2025-04-14 | End: 2025-04-16 | Stop reason: SDUPTHER

## 2025-04-14 NOTE — TELEPHONE ENCOUNTER
Rx Refill Note  Requested Prescriptions     Pending Prescriptions Disp Refills    FLUoxetine (PROzac) 10 MG capsule [Pharmacy Med Name: fluoxetine 10 mg capsule] 30 capsule 1     Sig: TAKE 1 CAPSULE BY MOUTH ONCE DAILY      Last office visit with prescribing clinician: 2/26/2025   Last telemedicine visit with prescribing clinician: Visit date not found   Next office visit with prescribing clinician: 4/16/2025                         Would you like a call back once the refill request has been completed: [] Yes [] No    If the office needs to give you a call back, can they leave a voicemail: [] Yes [] No    Keara Swanson MA  04/14/25, 07:18 EDT

## 2025-04-15 ENCOUNTER — OFFICE VISIT (OUTPATIENT)
Dept: NEUROLOGY | Facility: CLINIC | Age: OVER 89
End: 2025-04-15
Payer: MEDICARE

## 2025-04-15 VITALS
HEART RATE: 63 BPM | OXYGEN SATURATION: 98 % | HEIGHT: 62 IN | WEIGHT: 164 LBS | SYSTOLIC BLOOD PRESSURE: 118 MMHG | BODY MASS INDEX: 30.18 KG/M2 | DIASTOLIC BLOOD PRESSURE: 84 MMHG

## 2025-04-15 DIAGNOSIS — G40.109 FOCAL EPILEPSY: Primary | ICD-10-CM

## 2025-04-15 DIAGNOSIS — G25.0 ESSENTIAL TREMOR: ICD-10-CM

## 2025-04-15 PROBLEM — R42 SPINNING SENSATION: Status: RESOLVED | Noted: 2024-02-09 | Resolved: 2025-04-15

## 2025-04-15 RX ORDER — PROPRANOLOL HYDROCHLORIDE 60 MG/1
60 CAPSULE, EXTENDED RELEASE ORAL DAILY
Qty: 30 CAPSULE | Refills: 2 | Status: SHIPPED | OUTPATIENT
Start: 2025-04-15

## 2025-04-15 RX ORDER — LEVETIRACETAM 500 MG/1
500 TABLET ORAL 2 TIMES DAILY
Qty: 180 TABLET | Refills: 0 | Status: SHIPPED | OUTPATIENT
Start: 2025-04-15

## 2025-04-15 NOTE — ASSESSMENT & PLAN NOTE
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  She also has GIOVANA for which she is followed by sleep specialist at this time and she is using a CPAP.  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  She was on Dilantin and phenobarbital for about 50 years.  She was living in [facility] and was noted to have neuropathy due to Dilantin and this medicine was switched to Keppra.  She has had 6 seizures total and last one was reportedly over 25 years ago.  She has not had any seizure like activity on the Keppra and no side effects from this medicine.  She reports having some spinning sensation in her head and I referred her to vestibular therapist and she tells me the exercises made her feel bad but the dizziness went away and meclizine seems to have helped as well.  Years ago she had vertigo and had Epley maneuver which resolved her symptom at that time and tells me the meclizine has helped more recently.  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  She is currently on propranolol 20 mg four times per day and at times 3 times per day for the tremors which is helping but tends to stop helping after 5 hours and she has noticed the tremors more when she first wakes up.  She has not She is not driving, taking showers as opposed to baths and staying off of elevated places and heights.  She is currently living in an assisted living.  She has not had any recent falls.  She is not having any cognitive or memory issues.  She is doing some exercising and admits to needing to walk more.  She has not had any recent falls.  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  She denies any associated pain.  She does report experiencing some  depression and is on fluoxetine and is being evaluated with her PCP.  No thoughts of hurting herself or anyone else. I advised her to make sure she is getting lots of activity at the facility and if symptoms do not improve to let me know and we can refer her to psychiatry as well for further assistance and discuss further with her PCP as well.  I did inform them that levetiracetam can cause mood issues as well but she has been on this medicine for a long time and she does not think this is contributing to her symptoms.  Discussed seizure precautions.

## 2025-04-15 NOTE — ASSESSMENT & PLAN NOTE
I am going to switch the propranolol from regular formulation to long acting propranolol for further assistance as regular propranolol is helping but wearing off.  Advised her about light headedness associated with medication and standing up slowly and sitting down if feeling light headed.

## 2025-04-15 NOTE — PATIENT INSTRUCTIONS
In general, we recommend using good judgement when you are doing certain activities and to avoid those activities that if you were to have a seizure, you could harm yourself or others. In the state of [place], it is the law that you cannot drive within 90 days of a seizure. We also recommend not standing over open flames, not getting on high ladders or the roof, not swimming or taking baths by yourself (showers are ok) and not operating heavy machinery or power tools.  [facility]  Alberto Lion MD  Neurology clinic  [phone]    With anti-seizure medications, you may initially notice side effects of fatigue, drowsiness, unsteadiness, and dizziness.  Other possible side effects include nausea, abdominal pain, headache, blurry or double vision, slurred speech and mood changes.  Generally, patients will noticed these symptoms when the medication is first started or with higher doses and will go away with time.    It is import to consistently take your medication every day.  Missing just one dose may put you at risk for a breakthrough seizure.  Consider using reminders on your phone or a pill box.    If you develop a rash, please call the neurology clinic immediately or notify another healthcare professional, as this may be potentially life-threatening.  If you are unable to reach a healthcare professional, go to the emergency room immediately for further evaluation.    If you develop thoughts of wanting to hurt yourself or others, please call the neurology clinic immediately to notify another healthcare professional.  If you are unable to reach a healthcare professional, go to the emergency room immediately for further evaluation.    It is the [place] state law that you cannot drive within 90 days of a seizure.    You should avoid certain activities that if you were to have a seizure, you could harm yourself or others. In general, it is recommended that you avoid operating heavy machinery or  power tools, swimming or taking baths by yourself (showers are ok), don't stand over open flames, don't get on high ladders or the roof.  I also recommend to avoid sleeping on your stomach.    For further information on epilepsy and resources available to patients and their families, please visit the [facility] at [email or web address] or call [phone].    **Check out the [facility]'s monthly Art Group Gathering.  [email or web address] [address].  Call Lynne Mueller at [phone] or email her at [email or web address] for the dates of future gatherings.**      **If you have having memory problems, consider HOBSCOTCH (Home-Based Self-management and Cognitive Training Changes lives).  It is an 8 week self-management program for adults with epilepsy and memory problems.  The program is free at the [facility].  Contact Dot Daugherty at [phone] or [email or web address].**

## 2025-04-15 NOTE — ASSESSMENT & PLAN NOTE
I am going to switch the propranolol from regular formulation to long acting propranolol for further assistance.

## 2025-04-15 NOTE — PROGRESS NOTES
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  She also has GIOVANA for which she is followed by sleep specialist at this time and she is using a CPAP.  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  She was on Dilantin and phenobarbital for about 50 years.  She was living in [facility] and was noted to have neuropathy due to Dilantin and this medicine was switched to Keppra.  She has had 6 seizures total and last one was reportedly over 25 years ago.  She has not had any seizure like activity on the Keppra and no side effects from this medicine.  She reports having some spinning sensation in her head and I referred her to vestibular therapist and she tells me the exercises made her feel bad but the dizziness went away and meclizine seems to have helped as well.  Years ago she had vertigo and had Epley maneuver which resolved her symptom at that time and tells me the meclizine has helped more recently.  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  She is currently on propranolol 20 mg four times per day and at times 3 times per day for the tremors which is helping but tends to stop helping after 5 hours and she has noticed the tremors more when she first wakes up.  She has not She is not driving, taking showers as opposed to baths and staying off of elevated places and heights.  She is currently living in an assisted living.  She has not had any recent falls.  She is not having any  "cognitive or memory issues.  She is doing some exercising and admits to needing to walk more.  She has not had any recent falls.  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  She denies any associated pain.  She does report experiencing some depression and is on fluoxetine and is being evaluated with her PCP.  No thoughts of hurting herself or anyone else.      Past Medical History:   Diagnosis Date    Aneurysm     Aortic valve calcification 06/14/2021    Cataracts, bilateral     Constipation     Difficulty walking     Gall stones     Headache, tension-type     HL (hearing loss)     Hypertension     Pain in ankle     Right ankle    Peripheral neuropathy     Pre-diabetes     PRP (pityriasis rubra pilaris)     Ringing in ear     Seborrhea     Seizures     Sleep apnea     Vertigo     Wears hearing aid         Family History   Problem Relation Age of Onset    Osteoporosis Mother     Cancer Father     Neuropathy Sister         aortic valve    Osteoporosis Sister         Social History     Socioeconomic History    Marital status: Single    Number of children: 3   Tobacco Use    Smoking status: Never    Smokeless tobacco: Never    Tobacco comments:     caffeine use- \"occ\"   Vaping Use    Vaping status: Never Used   Substance and Sexual Activity    Alcohol use: Yes     Comment: beer,wine    Drug use: Yes     Types: Marijuana     Comment: \"hemp muffins\"    Sexual activity: Not Currently        I have reviewed and confirmed the accuracy of the ROS as documented by the MA/LPN/RN Alberto Lion MD   Review of Systems   Neurological:  Positive for tremors and seizures. Negative for dizziness, syncope, facial asymmetry, speech difficulty, weakness, light-headedness, numbness, headache, memory problem and confusion.   Psychiatric/Behavioral:  Negative for agitation, behavioral problems, decreased concentration, dysphoric mood, hallucinations, self-injury, sleep disturbance, suicidal ideas, negative for " "hyperactivity, depressed mood and stress. The patient is not nervous/anxious.    No recent seizures.     Objective   Vital Signs:   /84   Pulse 63   Ht 157.5 cm (62.01\")   Wt 74.4 kg (164 lb)   SpO2 98%   BMI 29.99 kg/m²       PHYSICAL EXAM:    General   Mental Status - Alert. General Appearance - Well developed, Well groomed, Oriented and Cooperative. Orientation - Oriented X3.       Head and Neck  Head - normocephalic, atraumatic with no lesions or palpable masses.  Neck       Global Assessment - supple.                                        Eye   Sclera/Conjunctiva - Bilateral - Normal.     ENMT  Mouth and Throat   Oral Cavity/Oropharynx: Oropharynx - the soft palate,uvula and tongue are normal in appearance.     Chest and Lung Exam   Chest - lung clear to auscultation bilaterally.     Cardiovascular   Cardiovascular examination reveals  - normal heart sounds, regular rate and rhythm.     Neurologic   Mental Status: Speech - Normal. Cognitive function - appropriate fund of knowledge. No impairment of attention, Impairment of concentration, impairment of long term memory or impairment of short term memory.  Cranial Nerves:   II Optic: Visual acuity - Left - Normal. Right - Normal. Visual fields - Reduced in left lower quadrant (chronic due to prior brain surgery).  III Oculomotor: Pupillary constriction - Left - Normal. Right - Normal.  VII Facial: - Normal Bilaterally.   IX Glossopharyngeal / X Vagus - Normal.  XI Accessory: Trapezius - Bilateral - Normal. Sternocleidomastoid - Bilateral - Normal.  XII Hypoglossal - Bilateral - Normal.  Eye Movements: - Normal Bilaterally.  Sensory:   Light Touch: Intact - Globally.  Motor:   Bulk and Contour: - Normal.  Tone: - Normal.  Tremor: Fast frequency tremor bilaterally.   Strength: 5/5 normal muscle strength - All Muscles.      General Assessment of Reflexes: - deep tendon reflexes are normal. Coordination - No Impairment of finger-to-nose or Impairment of " rapid alternating movements. Gait - Broad based, uses walker to ambulate.        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  She also has GIOVANA for which she is followed by sleep specialist at this time and she is using a CPAP.  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  She was on Dilantin and phenobarbital for about 50 years.  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  She has had 6 seizures total and last one was reportedly over 25 years ago.  She has not had any seizure like activity on the Keppra and no side effects from this medicine.  She reports having some spinning sensation in her head and I referred her to vestibular therapist and she tells me the exercises made her feel bad but the dizziness went away and meclizine seems to have helped as well.  Years ago she had vertigo and had Epley maneuver which resolved her symptom at that time and tells me the meclizine has helped more recently.  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  She is currently on propranolol 20 mg four times per day and at times 3 times per day for the tremors which is helping but tends to stop helping after 5 hours and she has noticed the tremors more when she first wakes up.  She has not She is not driving, taking showers as opposed to baths and staying off of elevated places and heights.  She is currently living in an assisted living.  She has not had any recent falls.  She is not having any cognitive or  memory issues.  She is doing some exercising and admits to needing to walk more.  She has not had any recent falls.  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  She denies any associated pain.  She does report experiencing some depression and is on fluoxetine and is being evaluated with her PCP.  No thoughts of hurting herself or anyone else. I advised her to make sure she is getting lots of activity at the facility and if symptoms do not improve to let me know and we can refer her to psychiatry as well for further assistance and discuss further with her PCP as well.  I did inform them that levetiracetam can cause mood issues as well but she has been on this medicine for a long time and she does not think this is contributing to her symptoms.  Discussed seizure precautions.           Relevant Medications    levETIRAcetam (KEPPRA) 500 MG tablet    Essential tremor    Current Assessment & Plan   I am going to switch the propranolol from regular formulation to long acting propranolol for further assistance as regular propranolol is helping but wearing off.  Advised her about light headedness associated with medication and standing up slowly and sitting down if feeling light headed.         Relevant Medications    propranolol LA (Inderal LA) 60 MG 24 hr capsule    levETIRAcetam (KEPPRA) 500 MG tablet       I spent 40 minutes caring for Mariela on this date of service. This time includes time spent by me in the following activities:preparing for the visit, reviewing tests, obtaining and/or reviewing a separately obtained history, performing a medically appropriate examination and/or evaluation , counseling and educating the patient/family/caregiver, ordering medications, tests, or procedures, documenting information in the medical record, and care coordination    Follow Up   Return in about 3 months (around 7/15/2025).  Patient was given instructions and counseling regarding her condition or for health  maintenance advice. Please see specific information pulled into the AVS if appropriate.

## 2025-04-16 ENCOUNTER — OFFICE VISIT (OUTPATIENT)
Dept: FAMILY MEDICINE CLINIC | Facility: CLINIC | Age: OVER 89
End: 2025-04-16
Payer: MEDICARE

## 2025-04-16 VITALS
HEART RATE: 57 BPM | WEIGHT: 161.2 LBS | BODY MASS INDEX: 29.66 KG/M2 | HEIGHT: 62 IN | OXYGEN SATURATION: 99 % | DIASTOLIC BLOOD PRESSURE: 73 MMHG | SYSTOLIC BLOOD PRESSURE: 123 MMHG

## 2025-04-16 DIAGNOSIS — F43.23 ACUTE ADJUSTMENT DISORDER WITH MIXED ANXIETY AND DEPRESSED MOOD: Primary | ICD-10-CM

## 2025-04-16 PROCEDURE — 1160F RVW MEDS BY RX/DR IN RCRD: CPT | Performed by: FAMILY MEDICINE

## 2025-04-16 PROCEDURE — 1125F AMNT PAIN NOTED PAIN PRSNT: CPT | Performed by: FAMILY MEDICINE

## 2025-04-16 PROCEDURE — 99214 OFFICE O/P EST MOD 30 MIN: CPT | Performed by: FAMILY MEDICINE

## 2025-04-16 PROCEDURE — 1159F MED LIST DOCD IN RCRD: CPT | Performed by: FAMILY MEDICINE

## 2025-04-16 RX ORDER — CETIRIZINE HYDROCHLORIDE 10 MG/1
10 TABLET ORAL DAILY
COMMUNITY

## 2025-04-16 NOTE — PROGRESS NOTES
"Chief Complaint  Depression (Prozac script pt stating that it has not made a big difference )    Subjective        Mariela Hopkins presents to [facility]  History of Present Illness  Patient is here today to follow-up on anxiety.  Patient is present today with her daughter who supplies some of the history.  At the end of February she was started on Prozac 10 mg daily for worsening symptoms of depression.  She has not really noticed much improvement on that dose.  At first she did have some headaches but does not feel that they are worse than normal.  Patient would agree to an increased dose at this time.  The patient felt \"ungrounded\" on Zoloft in the past.          Objective   Vital Signs:  /73   Pulse 57   Ht 157.5 cm (62.01\")   Wt 73.1 kg (161 lb 3.2 oz)   SpO2 99%   BMI 29.47 kg/m²   Estimated body mass index is 29.47 kg/m² as calculated from the following:    Height as of this encounter: 157.5 cm (62.01\").    Weight as of this encounter: 73.1 kg (161 lb 3.2 oz).         Physical Exam  Vitals and nursing note reviewed.   Constitutional:       Appearance: Normal appearance. She is well-developed.   HENT:      Head: Normocephalic and atraumatic.   Eyes:      General: No scleral icterus.     Conjunctiva/sclera: Conjunctivae normal.   Pulmonary:      Effort: Pulmonary effort is normal.   Musculoskeletal:         General: Normal range of motion.      Cervical back: Normal range of motion and neck supple.      Right lower leg: No edema.      Left lower leg: No edema.   Skin:     General: Skin is warm and dry.      Capillary Refill: Capillary refill takes less than 2 seconds.      Findings: No rash.   Neurological:      Mental Status: She is alert and oriented to person, place, and time.   Psychiatric:         Mood and Affect: Mood normal.         Behavior: Behavior normal.         Thought Content: Thought content normal.         Judgment: Judgment normal.        Result Review " :  The following data was reviewed by: Marla Mccray DO on 04/16/2025:  Common labs          6/9/2024    12:36 2/26/2025    11:47   Common Labs   Glucose  83    BUN  15    Creatinine  0.65    Sodium  142    Potassium  4.5    Chloride  104    Calcium  10.2    Albumin  4.4    Total Bilirubin  0.4    Alkaline Phosphatase  56    AST (SGOT)  21    ALT (SGPT)  13    WBC 10.31     9.0    Hemoglobin 13.7     14.2    Hematocrit 41.8     43.5    Platelets 308     307    Total Cholesterol  197    Triglycerides  117    HDL Cholesterol  54    LDL Cholesterol   122    Hemoglobin A1C  6.1       Details          This result is from an external source.             TSH          2/26/2025    11:47   TSH   TSH 1.750                Assessment and Plan   Diagnoses and all orders for this visit:    1. Acute adjustment disorder with mixed anxiety and depressed mood (Primary)  -     FLUoxetine (PROzac) 20 MG capsule; Take 1 capsule by mouth Daily.  Dispense: 90 capsule; Refill: 0    Patient is here to follow-up on persistent symptoms of depression.  We will increase fluoxetine to 20 mg daily.  Patient will notify provider if she has any issues with the higher dose.         Follow Up   Return in 10 months (on 2/27/2026) for Medicare Wellness.  Patient was given instructions and counseling regarding her condition or for health maintenance advice. Please see specific information pulled into the AVS if appropriate.

## 2025-05-15 ENCOUNTER — TELEPHONE (OUTPATIENT)
Dept: NEUROLOGY | Facility: CLINIC | Age: OVER 89
End: 2025-05-15

## 2025-05-15 NOTE — TELEPHONE ENCOUNTER
Provider: DR WEATHERS    Caller: Mariela Hopkins    Relationship to Patient: Self    Pharmacy: [facility]    Phone Number: [phone]    Reason for Call: WOULD LIKE TO SEE IF SHE CAN GET THE SCRIPT FOR PROPRANOLOL 60 MG INCREASED. STATES WHEN SHE WAKES UP IN THE MORNING AROUND 6 AM SHE IS VERY SHAKY, DOESN'T SEEM TO LAST THE FULL 24 HOURS. TAKES IT WHEN SHE FIRST WAKES UP. PLEASE ADVISE, THANK YOU.

## 2025-05-16 ENCOUNTER — TELEPHONE (OUTPATIENT)
Dept: FAMILY MEDICINE CLINIC | Facility: CLINIC | Age: OVER 89
End: 2025-05-16

## 2025-05-16 DIAGNOSIS — F43.23 ACUTE ADJUSTMENT DISORDER WITH MIXED ANXIETY AND DEPRESSED MOOD: ICD-10-CM

## 2025-05-16 NOTE — TELEPHONE ENCOUNTER
Caller: Mariela Hopkins    Relationship: Self    Best call back number: [phone]     What was the call regarding: PATIENT STATED THAT HER TREMORS HAD GOTTEN BAD IN THE MORNING. PATIENT STATED THAT HER NEUROLOGIST BELIEVES IT IS DUE TO HER PROZAC. PATIENT STATED THAT SHE IS REACHING OUT TO ASK ABOUT REDUCING THE DOSAGE OF PROZAC OR BEING GIVEN A DIFFERENT MEDICATION IN PLACE OF PROZAC. PLEASE ADVISE.

## 2025-05-18 RX ORDER — FLUOXETINE 10 MG/1
10 CAPSULE ORAL DAILY
Qty: 30 CAPSULE | Refills: 3 | Status: SHIPPED | OUTPATIENT
Start: 2025-05-18 | End: 2025-05-27 | Stop reason: SDUPTHER

## 2025-05-18 NOTE — TELEPHONE ENCOUNTER
Lets try reducing her dose of Prozac first and see if that helps.  I sent in the 10mg dose to [place] pharmacy.  It may take a few weeks to see a difference.

## 2025-05-27 DIAGNOSIS — F43.23 ACUTE ADJUSTMENT DISORDER WITH MIXED ANXIETY AND DEPRESSED MOOD: ICD-10-CM

## 2025-05-27 RX ORDER — FLUOXETINE 10 MG/1
10 CAPSULE ORAL DAILY
Qty: 90 CAPSULE | Refills: 1 | Status: SHIPPED | OUTPATIENT
Start: 2025-05-27

## 2025-05-27 NOTE — TELEPHONE ENCOUNTER
Caller: Mariela Hopkins    Relationship: Self  [phone]    Requested Prescriptions:   Requested Prescriptions     Pending Prescriptions Disp Refills    FLUoxetine (PROzac) 10 MG capsule 30 capsule 3     Sig: Take 1 capsule by mouth Daily.        [facility], [address] [phone] [facility] [phone] FX     Last office visit with prescribing clinician: 4/16/2025   Last telemedicine visit with prescribing clinician: Visit date not found   Next office visit with prescribing clinician: 3/2/2026     Additional details provided by patient:     Does the patient have less than a 3 day supply:  [x] Yes  [] No      Ranjan Cool Rep   05/27/25 12:45 EDT

## 2025-06-23 DIAGNOSIS — G25.0 ESSENTIAL TREMOR: ICD-10-CM

## 2025-06-23 RX ORDER — PROPRANOLOL HYDROCHLORIDE 60 MG/1
60 CAPSULE, EXTENDED RELEASE ORAL DAILY
Qty: 30 CAPSULE | Refills: 0 | Status: SHIPPED | OUTPATIENT
Start: 2025-06-23

## 2025-07-01 DIAGNOSIS — G25.0 ESSENTIAL TREMOR: ICD-10-CM

## 2025-07-01 RX ORDER — PROPRANOLOL HYDROCHLORIDE 60 MG/1
60 CAPSULE, EXTENDED RELEASE ORAL DAILY
Qty: 30 CAPSULE | Refills: 0 | OUTPATIENT
Start: 2025-07-01

## 2025-07-01 NOTE — TELEPHONE ENCOUNTER
Caller: KellieMariela dang    Relationship: Self    Best call back number: [phone]    Requested Prescriptions:   Requested Prescriptions     Pending Prescriptions Disp Refills    propranolol LA (INDERAL LA) 60 MG 24 hr capsule 30 capsule 0     Sig: Take 1 capsule by mouth Daily.        Pharmacy where request should be sent: [facility], [address] [phone] [facility] [phone] FX     Last office visit with prescribing clinician: 4/15/2025   Last telemedicine visit with prescribing clinician: Visit date not found   Next office visit with prescribing clinician: 7/15/2025     Additional details provided by patient: WANTS TO USE [facility] NOW    Does the patient have less than a 3 day supply:  [] Yes  [x] No    Would you like a call back once the refill request has been completed: [] Yes [x] No    If the office needs to give you a call back, can they leave a voicemail: [] Yes [x] No    Ranjan Dhaliwal   07/01/25 11:30 EDT

## 2025-07-15 ENCOUNTER — OFFICE VISIT (OUTPATIENT)
Dept: NEUROLOGY | Facility: CLINIC | Age: OVER 89
End: 2025-07-15
Payer: MEDICARE

## 2025-07-15 ENCOUNTER — LAB (OUTPATIENT)
Dept: LAB | Facility: HOSPITAL | Age: OVER 89
End: 2025-07-15
Payer: MEDICARE

## 2025-07-15 VITALS
SYSTOLIC BLOOD PRESSURE: 110 MMHG | WEIGHT: 159 LBS | HEIGHT: 62 IN | BODY MASS INDEX: 29.26 KG/M2 | DIASTOLIC BLOOD PRESSURE: 82 MMHG | OXYGEN SATURATION: 95 % | HEART RATE: 60 BPM

## 2025-07-15 DIAGNOSIS — G62.9 PERIPHERAL POLYNEUROPATHY: ICD-10-CM

## 2025-07-15 DIAGNOSIS — R25.1 TREMOR: ICD-10-CM

## 2025-07-15 DIAGNOSIS — G40.109 FOCAL EPILEPSY: Primary | ICD-10-CM

## 2025-07-15 PROBLEM — G25.0 ESSENTIAL TREMOR: Status: RESOLVED | Noted: 2025-04-15 | Resolved: 2025-07-15

## 2025-07-15 LAB
FOLATE SERPL-MCNC: >20 NG/ML (ref 4.78–24.2)
VIT B12 BLD-MCNC: 480 PG/ML (ref 211–946)

## 2025-07-15 PROCEDURE — 1159F MED LIST DOCD IN RCRD: CPT | Performed by: PSYCHIATRY & NEUROLOGY

## 2025-07-15 PROCEDURE — 82746 ASSAY OF FOLIC ACID SERUM: CPT

## 2025-07-15 PROCEDURE — 1160F RVW MEDS BY RX/DR IN RCRD: CPT | Performed by: PSYCHIATRY & NEUROLOGY

## 2025-07-15 PROCEDURE — 84425 ASSAY OF VITAMIN B-1: CPT | Performed by: PSYCHIATRY & NEUROLOGY

## 2025-07-15 PROCEDURE — 99215 OFFICE O/P EST HI 40 MIN: CPT | Performed by: PSYCHIATRY & NEUROLOGY

## 2025-07-15 PROCEDURE — 82607 VITAMIN B-12: CPT

## 2025-07-15 PROCEDURE — 36415 COLL VENOUS BLD VENIPUNCTURE: CPT

## 2025-07-15 RX ORDER — PROPRANOLOL HYDROCHLORIDE 60 MG/1
60 CAPSULE, EXTENDED RELEASE ORAL DAILY
Qty: 90 CAPSULE | Refills: 1 | Status: SHIPPED | OUTPATIENT
Start: 2025-07-15

## 2025-07-15 RX ORDER — LEVETIRACETAM 500 MG/1
500 TABLET ORAL 2 TIMES DAILY
Qty: 180 TABLET | Refills: 1 | Status: SHIPPED | OUTPATIENT
Start: 2025-07-15

## 2025-07-15 NOTE — ASSESSMENT & PLAN NOTE
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  She also has GIOVANA for which she is followed by sleep specialist at this time and she is using a CPAP.  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  She was on Dilantin and phenobarbital for about 50 years.  She was living in [place] and was noted to have neuropathy due to Dilantin and this medicine was switched to Keppra.  She has had 6 seizures total and last one was reportedly over 25 years ago.  She has not had any seizure like activity on the Keppra and no side effects from this medicine.  She reports having some spinning sensation in her head and I referred her to vestibular therapist and she tells me the exercises made her feel bad but the dizziness went away and meclizine seems to have helped as well.  Years ago she had vertigo and had Epley maneuver which resolved her symptom at that time and tells me the meclizine has helped more recently.  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  She is currently on propranolol LA 60 mg daily for which she has previously been evaluated by movement disorder specialist who also recommended continuing the propranolol and no further medication changes.  She denies feeling light headed on this medicine.  She continues to have some tremors in the AM.  She reports doing better with the reduced dose of the fluoxetine.  She is not driving, taking showers as opposed to baths and staying off of elevated places and heights.  She is currently living in an assisted living.  She fell out of the bed a few weeks ago after having a vivid dream and she tells me she sleeps close to the edge of her bed due to having to use her CPAP machine.  She did not bite her tongue or lose bowel or bladder.  She has not had any falls  "otherwise and has been using her walker.  She has had lab work from 2/2025 which I reviewed today including normal CMP, elevated HgbA1c 6.1%, normal TSH and globulin.  She is not having any cognitive or memory issues.  She is doing some exercising and admits to needing to walk more.  She has not had any recent falls.  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  She denies any associated pain.  She does report experiencing some depression which she thinks has improved with fluoxetine.  No thoughts of hurting herself or anyone else.  She has had a head CT scan at [place] in 6/2024 which demonstrated area of chronic encephalomalacia involving the right temporo-parietal head region and prior surgical clips.  They deny any recent seizure like activity including no staring spells or confusion.  She has in the past had a weird sensation in her head that she can't describe like \"my head was empty\" but she has not had this in over a year.  She has been evaluated by her sleep specialist as well.  At this time I advised her to continue the levetiracetam 500 mg every 12 hours for seizure prevention without missing any doses as epileptic seizures can be potentially fatal.  She is using a pill box and no issues with taking her medications as prescribed.  Discussed seizure precautions.    "

## 2025-07-15 NOTE — PROGRESS NOTES
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  She also has GIOVANA for which she is followed by sleep specialist at this time and she is using a CPAP.  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  She was on Dilantin and phenobarbital for about 50 years.  She was living in [facility] and was noted to have neuropathy due to Dilantin and this medicine was switched to Keppra.  She has had 6 seizures total and last one was reportedly over 25 years ago.  She has not had any seizure like activity on the Keppra and no side effects from this medicine.  She reports having some spinning sensation in her head and I referred her to vestibular therapist and she tells me the exercises made her feel bad but the dizziness went away and meclizine seems to have helped as well.  Years ago she had vertigo and had Epley maneuver which resolved her symptom at that time and tells me the meclizine has helped more recently.  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  She is currently on propranolol LA 60 mg daily for which she has previously been evaluated by movement disorder specialist who also recommended continuing the propranolol and no further medication changes.  She denies feeling light headed on this medicine.  She continues to have some tremors in the AM.  She reports doing better with the reduced dose of the fluoxetine.  She is not driving, taking showers as opposed to  "baths and staying off of elevated places and heights.  She is currently living in an assisted living.  She fell out of the bed a few weeks ago after having a vivid dream and she tells me she sleeps close to the edge of her bed due to having to use her CPAP machine.  She did not bite her tongue or lose bowel or bladder.  She has not had any falls otherwise and has been using her walker.  She has had lab work from 2/2025 which I reviewed today including normal CMP, elevated HgbA1c 6.1%, normal TSH and globulin.  She is not having any cognitive or memory issues.  She is doing some exercising and admits to needing to walk more.  She has not had any recent falls.  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  She denies any associated pain.  She does report experiencing some depression which she thinks has improved with fluoxetine.  No thoughts of hurting herself or anyone else.  She has had a head CT scan at [place] in 6/2024 which demonstrated area of chronic encephalomalacia involving the right temporo-parietal head region and prior surgical clips.  They deny any recent seizure like activity including no staring spells or confusion.  She has in the past had a weird sensation in her head that she can't describe like \"my head was empty\" but she has not had this in over a year.  She has been evaluated by her sleep specialist as well.      Past Medical History:   Diagnosis Date    Aneurysm     Aortic valve calcification 06/14/2021    Cataracts, bilateral     Constipation     Difficulty walking     Gall stones     Headache, tension-type     HL (hearing loss)     Hypertension     Pain in ankle     Right ankle    Peripheral neuropathy     Pre-diabetes     PRP (pityriasis rubra pilaris)     Ringing in ear     Seborrhea     Seizures     Sleep apnea     Vertigo     Wears hearing aid         Family History   Problem Relation Age of Onset    " "Osteoporosis Mother     Cancer Father     Neuropathy Sister         aortic valve    Osteoporosis Sister         Social History     Socioeconomic History    Marital status: Single    Number of children: 3   Tobacco Use    Smoking status: Never    Smokeless tobacco: Never    Tobacco comments:     caffeine use- \"occ\"   Vaping Use    Vaping status: Never Used   Substance and Sexual Activity    Alcohol use: Yes     Comment: beer,wine    Drug use: Yes     Types: Marijuana     Comment: \"hemp muffins\"    Sexual activity: Not Currently        I have reviewed and confirmed the accuracy of the ROS as documented by the MA/LPN/RN Alberto Lion MD   Review of Systems   Neurological:  Negative for dizziness, tremors, seizures, syncope, facial asymmetry, speech difficulty, weakness, light-headedness, numbness, headache, memory problem and confusion.   Psychiatric/Behavioral:  Negative for agitation, behavioral problems, decreased concentration, dysphoric mood, hallucinations, self-injury, sleep disturbance, suicidal ideas, negative for hyperactivity, depressed mood and stress. The patient is not nervous/anxious.         Objective   Vital Signs:   /82   Pulse 60   Ht 157.5 cm (62.01\")   Wt 72.1 kg (159 lb)   SpO2 95%   BMI 29.07 kg/m²       PHYSICAL EXAM:    General   Mental Status - Alert. General Appearance - Well developed, Well groomed, Oriented and Cooperative. Orientation - Oriented X3.       Head and Neck  Head - normocephalic, atraumatic with no lesions or palpable masses.  Neck    Global Assessment - supple.       Eye   Sclera/Conjunctiva - Bilateral - Normal.    ENMT  Mouth and Throat   Oral Cavity/Oropharynx: Oropharynx - the soft palate,uvula and tongue are normal in appearance.    Chest and Lung Exam   Chest - lung clear to auscultation bilaterally.    Cardiovascular   Cardiovascular examination reveals  - normal heart sounds, regular rate and rhythm.    Neurologic   Mental Status: Speech - Normal. " Cognitive function - appropriate fund of knowledge. No impairment of attention, Impairment of concentration, impairment of long term memory or impairment of short term memory.  Cranial Nerves:   II Optic: Visual acuity - Left - Normal. Right - Normal. Visual fields - Reduced in left lower quadrant (chronic due to prior brain surgery).  III Oculomotor: Pupillary constriction - Left - Normal. Right - Normal.  VII Facial: - Normal Bilaterally.   IX Glossopharyngeal / X Vagus - Normal.  XI Accessory: Trapezius - Bilateral - Normal. Sternocleidomastoid - Bilateral - Normal.  XII Hypoglossal - Bilateral - Normal.  Eye Movements: - Normal Bilaterally.  Sensory:   Light Touch: Intact - Globally.  Vibration: Reduced in bilateral lower extremities.   Motor:   Bulk and Contour: - Normal.  Tone: - Normal.  Tremor: Fast frequency tremor bilaterally.   Strength: 5/5 normal muscle strength - All Muscles.      General Assessment of Reflexes: - deep tendon reflexes are normal. Coordination - No Impairment of finger-to-nose or Impairment of rapid alternating movements. Gait - Broad based, uses walker to ambulate.      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  She also has GIOVANA for which she is followed by sleep specialist at this time and she is using a CPAP.  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  She was on Dilantin and phenobarbital for about 50 years.  She was living in [facility] and was noted to have neuropathy due to Dilantin and this medicine was switched to Keppra.  She has had 6 seizures total and last one was reportedly over 25 years ago.  She has not had any seizure like activity on the Keppra and no side  effects from this medicine.  She reports having some spinning sensation in her head and I referred her to vestibular therapist and she tells me the exercises made her feel bad but the dizziness went away and meclizine seems to have helped as well.  Years ago she had vertigo and had Epley maneuver which resolved her symptom at that time and tells me the meclizine has helped more recently.  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  She is currently on propranolol LA 60 mg daily for which she has previously been evaluated by movement disorder specialist who also recommended continuing the propranolol and no further medication changes.  She denies feeling light headed on this medicine.  She continues to have some tremors in the AM.  She reports doing better with the reduced dose of the fluoxetine.  She is not driving, taking showers as opposed to baths and staying off of elevated places and heights.  She is currently living in an assisted living.  She fell out of the bed a few weeks ago after having a vivid dream and she tells me she sleeps close to the edge of her bed due to having to use her CPAP machine.  She did not bite her tongue or lose bowel or bladder.  She has not had any falls otherwise and has been using her walker.  She has had lab work from 2/2025 which I reviewed today including normal CMP, elevated HgbA1c 6.1%, normal TSH and globulin.  She is not having any cognitive or memory issues.  She is doing some exercising and admits to needing to walk more.  She has not had any recent falls.  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  She denies any associated pain.  She does report experiencing some depression which she thinks has improved with fluoxetine.  No thoughts of hurting herself or anyone else.  She has had a head CT scan at [place] in 6/2024 which  "demonstrated area of chronic encephalomalacia involving the right temporo-parietal head region and prior surgical clips.  They deny any recent seizure like activity including no staring spells or confusion.  She has in the past had a weird sensation in her head that she can't describe like \"my head was empty\" but she has not had this in over a year.  She has been evaluated by her sleep specialist as well.  At this time I advised her to continue the levetiracetam 500 mg every 12 hours for seizure prevention without missing any doses as epileptic seizures can be potentially fatal.  She is using a pill box and no issues with taking her medications as prescribed.  Discussed seizure precautions.           Relevant Medications    levETIRAcetam (KEPPRA) 500 MG tablet    Tremor    Current Assessment & Plan   Will continue current dose of propranolol.           Relevant Medications    propranolol LA (INDERAL LA) 60 MG 24 hr capsule    Peripheral polyneuropathy    Current Assessment & Plan   She has reduced vibration in lower extremities bilaterally.  She endorses numbness in her feet.  This can contribute to trouble with gait and balance and I advised her to reduce line of site slightly to help with her vision in knowing where are feet are in space to help with the sensory ataxia.  I advised her to get her blood sugar under better control.  Dilantin likely also caused neuropathy.  She does not have any pain so will not start any pain ointment.  I will check Vit B12, folate and thiamine levels today.           Relevant Orders    Vitamin B12    Folate    Vitamin B1, Whole Blood       I spent 45 minutes caring for Mariela on this date of service. This time includes time spent by me in the following activities:preparing for the visit, reviewing tests, obtaining and/or reviewing a separately obtained history, performing a medically appropriate examination and/or evaluation , counseling and educating the patient/family/caregiver, " ordering medications, tests, or procedures, documenting information in the medical record, independently interpreting results and communicating that information with the patient/family/caregiver, and care coordination    Follow Up   Return in about 6 months (around 1/15/2026).  Patient was given instructions and counseling regarding her condition or for health maintenance advice. Please see specific information pulled into the AVS if appropriate.

## 2025-07-15 NOTE — PATIENT INSTRUCTIONS
In general, we recommend using good judgement when you are doing certain activities and to avoid those activities that if you were to have a seizure, you could harm yourself or others. In the state of [place], it is the law that you cannot drive within 90 days of a seizure. We also recommend not standing over open flames, not getting on high ladders or the roof, not swimming or taking baths by yourself (showers are ok) and not operating heavy machinery or power tools.  [facility]  Alberto Lion MD  Neurology clinic  [phone]    With anti-seizure medications, you may initially notice side effects of fatigue, drowsiness, unsteadiness, and dizziness.  Other possible side effects include nausea, abdominal pain, headache, blurry or double vision, slurred speech and mood changes.  Generally, patients will noticed these symptoms when the medication is first started or with higher doses and will go away with time.    It is import to consistently take your medication every day.  Missing just one dose may put you at risk for a breakthrough seizure.  Consider using reminders on your phone or a pill box.    If you develop a rash, please call the neurology clinic immediately or notify another healthcare professional, as this may be potentially life-threatening.  If you are unable to reach a healthcare professional, go to the emergency room immediately for further evaluation.    If you develop thoughts of wanting to hurt yourself or others, please call the neurology clinic immediately to notify another healthcare professional.  If you are unable to reach a healthcare professional, go to the emergency room immediately for further evaluation.    It is the [place] state law that you cannot drive within 90 days of a seizure.    You should avoid certain activities that if you were to have a seizure, you could harm yourself or others. In general, it is recommended that you avoid operating heavy machinery or  power tools, swimming or taking baths by yourself (showers are ok), don't stand over open flames, don't get on high ladders or the roof.  I also recommend to avoid sleeping on your stomach.    For further information on epilepsy and resources available to patients and their families, please visit the [facility] at [email or web address] or call [phone].    **Check out the [facility]'s monthly Art Group Gathering.  They are [facility] at [facility] [address].  Call Lynne Mueller at [phone] or email her at [email or web address] for the dates of future gatherings.**      **If you have having memory problems, consider HOBSCOTCH (Home-Based Self-management and Cognitive Training Changes lives).  It is an 8 week self-management program for adults with epilepsy and memory problems.  The program is free at the [facility].  Contact Dot Daugherty at [phone] or [email or web address].**

## 2025-07-15 NOTE — ASSESSMENT & PLAN NOTE
She has reduced vibration in lower extremities bilaterally.  She endorses numbness in her feet.  This can contribute to trouble with gait and balance and I advised her to reduce line of site slightly to help with her vision in knowing where are feet are in space to help with the sensory ataxia.  I advised her to get her blood sugar under better control.  Dilantin likely also caused neuropathy.  She does not have any pain so will not start any pain ointment.  I will check Vit B12, folate and thiamine levels today.     Biopsy Type: H and E

## 2025-07-18 LAB — VIT B1 BLD-SCNC: 97.4 NMOL/L (ref 66.5–200)

## 2025-07-28 RX ORDER — FLUTICASONE PROPIONATE 50 MCG
2 SPRAY, SUSPENSION (ML) NASAL DAILY
Qty: 16 G | Refills: 0 | Status: SHIPPED | OUTPATIENT
Start: 2025-07-28

## 2025-07-28 NOTE — TELEPHONE ENCOUNTER
Rx Refill Note  Requested Prescriptions     Pending Prescriptions Disp Refills    fluticasone (FLONASE) 50 MCG/ACT nasal spray [Pharmacy Med Name: FLUTICASONE PROP SPRAY 50MCG] 16 g 0     Sig: ADMINISTER 2 SPRAYS INTO THE NOSTRIL(S) AS DIRECTED BY PROVIDER DAILY.      Last office visit with prescribing clinician: 4/16/2025   Last telemedicine visit with prescribing clinician: Visit date not found   Next office visit with prescribing clinician: 3/2/2026                         Would you like a call back once the refill request has been completed: [] Yes [] No    If the office needs to give you a call back, can they leave a voicemail: [] Yes [] No    Keara Torres MA  07/28/25, 08:01 EDT